# Patient Record
Sex: FEMALE | Race: WHITE | NOT HISPANIC OR LATINO | Employment: FULL TIME | URBAN - NONMETROPOLITAN AREA
[De-identification: names, ages, dates, MRNs, and addresses within clinical notes are randomized per-mention and may not be internally consistent; named-entity substitution may affect disease eponyms.]

---

## 2020-12-07 ENCOUNTER — OCCUPATIONAL MEDICINE (OUTPATIENT)
Dept: URGENT CARE | Facility: PHYSICIAN GROUP | Age: 50
End: 2020-12-07
Payer: COMMERCIAL

## 2020-12-07 VITALS
HEIGHT: 70 IN | RESPIRATION RATE: 12 BRPM | HEART RATE: 71 BPM | SYSTOLIC BLOOD PRESSURE: 142 MMHG | BODY MASS INDEX: 20.04 KG/M2 | TEMPERATURE: 98.6 F | OXYGEN SATURATION: 99 % | WEIGHT: 140 LBS | DIASTOLIC BLOOD PRESSURE: 90 MMHG

## 2020-12-07 DIAGNOSIS — S20.221A CONTUSION OF RIGHT SIDE OF BACK, INITIAL ENCOUNTER: ICD-10-CM

## 2020-12-07 PROCEDURE — 99204 OFFICE O/P NEW MOD 45 MIN: CPT | Performed by: PHYSICIAN ASSISTANT

## 2020-12-07 ASSESSMENT — PAIN SCALES - GENERAL: PAINLEVEL: 8=MODERATE-SEVERE PAIN

## 2020-12-07 NOTE — PROGRESS NOTES
"Chief Complaint   Patient presents with   • Work-Related Injury     Back px, Buckets fell on top of her. x1day        HISTORY OF PRESENT ILLNESS: Patient is a 49 y.o. 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 Bins were heavy, unsure of exact weight.  Feels a little better today but still has pain. Current pain: 8/10.  Triggers: lifting, prolonged standing/walking.  Relievers: \"marijuana gummies\". OTC meds tried: ibuprofen, helps take the edge off.  History of back issues: none.  Other places of employment: none.    Allergies:Patient has no known allergies.    PMH: No pertinent past medical history to this problem  MEDS: Medications were reviewed in Epic  ALLERGIES: Allergies were reviewed in Epic  SOCHX: Works as a Dispenser OGP at [employer]  FH: No pertinent family history to this problem    Review of Systems:    Constitutional ROS: No unexpected change in weight, No weakness, No fatigue  Musculoskeletal/Extremities ROS: Positive for back pain.  Skin/Integumentary ROS: No edema, No evidence of rash, No itching    Exam:  /90   Pulse 71   Temp 37 °C (98.6 °F) (Temporal)   Resp 12   Ht 1.778 m (5' 10\")   Wt 63.5 kg (140 lb)   SpO2 99%   General: Well developed, well nourished. No distress.  Pulmonary: Unlabored respiratory effort.    Musculoskeletal: Diffuse tenderness in the paraspinous muscles of the right side of the back, worse in the lumbar region.  No vertebral tenderness noted.  No ecchymosis, abrasions, erythema, or skin changes noted in the area of pain.  Stiff movement.  No motor or sensory deficit noted of the lower extremities.  Skin: Warm, dry, good turgor. No rashes in visible areas.   Psych: Normal mood. Alert and oriented x3. Judgment and insight is normal.    Assessment/Plan:  Low suspicion for fracture.  Discussed appropriate dosing of " ibuprofen/acetaminophen as needed for pain.  May use ice, heat, and over-the-counter muscle rubs as needed for additional pain relief.  Refer to D 39 for restrictions.  Return to clinic in 1 week for reevaluation, sooner for any significant changes in symptoms.  1. Contusion of right side of back, initial encounter

## 2020-12-07 NOTE — LETTER
"EMPLOYEE’S CLAIM FOR COMPENSATION/ REPORT OF INITIAL TREATMENT  FORM C-4    EMPLOYEE’S CLAIM - PROVIDE ALL INFORMATION REQUESTED   First Name  Giuliana Last Name  Alexia Birthdate                    1970                Sex  female Claim Number   Home Address  [address] Age  49 y.o. Height  1.778 m (5' 10\") Weight  63.5 kg (140 lb) [facility]     [facility] [place] Zip  [address] Telephone  [phone] (home)    Mailing Address  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 (if applicable)  I was dispensing an order     How did this injury or occupational disease occur? (Be specific an answer in detail. Use additional sheet if necessary)  I took an order out, and on the ramp, the bins on the order fell on my back    If you believe that you have an occupational disease, when did you first have knowledge of the disability and it relationship to your employment?  NA Witnesses to the Accident  Luis Fernando Carrero      Nature of Injury or Occupational Disease  Strain  Part(s) of Body Injured or Affected  Lower Back Area (Lumbar Area & Lumbo-Sacral), N/A, N/A    I certify that the above is true and correct to the best of my knowledge and that I have provided this information in " order to obtain the benefits of [place]  I hereby authorize any physician, chiropractor, surgeon, practitioner, or other person, any hospital, including [facility] or [facility] hospital, any medical service organization, any insurance company, or other institution or organization to release to each other, any medical or other information, including benefits paid or payable, pertinent to this injury or disease, except information relative to diagnosis, treatment and/or counseling for AIDS, psychological conditions, alcohol or controlled substances, for which I must give specific authorization.  A Photostat of this authorization shall be as valid as the original.     Date 12/07/2020   Place [facility]   Employee’s Signature   THIS REPORT MUST BE COMPLETED AND MAILED WITHIN 3 WORKING DAYS OF TREATMENT   Place  [facility]  Name of Facility  [place]   Date  12/7/2020 Diagnosis  (S20.221A) Contusion of right side of back, initial encounter Is there evidence the injured employee was under the              influence of alcohol and/or another controlled substance at the time of accident?   Hour  9:13 AM Description of Injury or Disease  The encounter diagnosis was Contusion of right side of back, initial encounter. No   Treatment  Assessment/Plan:  Low suspicion for fracture.  Discussed appropriate dosing of ibuprofen/acetaminophen as needed for pain.  May use ice, heat, and over-the-counter muscle rubs as needed for additional pain relief.  Refer to D 39 for restrictions.  Return to clinic in 1 week for reevaluation, sooner for any significant changes in symptoms.  1. Contusion of right side of back, initial encounter        Have you advised the patient to remain off work five days or     more? No   X-Ray Findings      If Yes   From Date  To Date      From information given by the  "employee, together with medical evidence, can you directly connect this injury or occupational disease as job incurred?  Yes If No Full Duty    No Modified Duty  Yes   Is additional medical care by a physician indicated?  Yes If Modified Duty, Specify any Limitations / Restrictions  See D39   Do you know of any previous injury or disease contributing to this condition or occupational disease?                            No   Date  12/7/2020 Print Doctor’s Name   Kimmie Mejia P.A.-C. I certify the employer’s copy of  this form was mailed on:   Address  [address] Insurer’s Use Only     [place]  [address]    Provider’s Tax ID Number  [id number] Telephone  Dept: [phone]      e-KIMMIE Mc P.A.-C.  Signature:     Degree          ORIGINAL-TREATING PHYSICIAN OR CHIROPRACTOR    PAGE 2-INSURER/TPA    PAGE 3-EMPLOYER    PAGE 4-EMPLOYEE        Form C-4 (rev.10/07)          BRIEF DESCRIPTION OF RIGHTS AND BENEFITS  (Pursuant to [place] 616C.050)    Notice of Injury or Occupational Disease (Incident Report Form C-1): If an injury or occupational disease (OD) arises out of and in the course of employment, you must provide written notice to your employer as soon as practicable, but no later than 7 days after the accident or OD. Your employer shall maintain a sufficient supply of the required forms.     Claim for Compensation (Form C-4): If medical treatment is sought, the form C-4 is available at the place of initial treatment. A completed \"Claim for Compensation\" (Form C-4) must be filed within 90 days after an accident or OD. The treating physician or chiropractor must, within 3 working days after treatment, complete and mail to the employer, the employer's insurer and third-party , the Claim for Compensation.     Medical Treatment: If you require medical treatment for your on-the-job injury or OD, you may be required to select a physician or chiropractor from a list " provided by your workers’ compensation insurer, if it has contracted with an Organization for Managed Care (MCO) or Preferred Provider Organization (PPO) or providers of health care. If your employer has not entered into a contract with an MCO or PPO, you may select a physician or chiropractor from the Panel of Physicians and Chiropractors. Any medical costs related to your industrial injury or OD will be paid by your insurer.     Temporary Total Disability (TTD): If your doctor has certified that you are unable to work for a period of at least 5 consecutive days, or 5 cumulative days in a 20-day period, or places restrictions on you that your employer does not accommodate, you may be entitled to TTD compensation.     Temporary Partial Disability (TPD): If the wage you receive upon reemployment is less than the compensation for TTD to which you are entitled, the insurer may be required to pay you TPD compensation to make up the difference. TPD can only be paid for a maximum of 24 months.     Permanent Partial Disability (PPD): When your medical condition is stable and there is an indication of a PPD as a result of your injury or OD, within 30 days, your insurer must arrange for an evaluation by a rating physician or chiropractor to determine the degree of your PPD. The amount of your PPD award depends on the date of injury, the results of the PPD evaluation and your age and wage.     Permanent Total Disability (PTD): If you are medically certified by a treating physician or chiropractor as permanently and totally disabled and have been granted a PTD status by your insurer, you are entitled to receive monthly benefits not to exceed 66 2/3% of your average monthly wage. The amount of your PTD payments is subject to reduction if you previously received a PPD award.     Vocational Rehabilitation Services: You may be eligible for vocational rehabilitation services if you are unable to return to the job due to a permanent  physical impairment or permanent restrictions as a result of your injury or occupational disease.     Transportation and Per Isidra Reimbursement: You may be eligible for travel expenses and per isidra associated with medical treatment.     Reopening: You may be able to reopen your claim if your condition worsens after claim closure.     Appeal Process: If you disagree with a written determination issued by the insurer or the insurer does not respond to your request, you may appeal to the Department of Administration, , by following the instructions contained in your determination letter. You must appeal the determination within 70 days from the date of the determination letter at [address], [address], [place], [place] [address], or [address], [address], [place] [address]. If you disagree with the  decision, you may appeal to the Department of Administration, . You must file your appeal within 30 days from the date of the  decision letter at [address], [address], [place], [place] [address], or [address] [facility] [address], [place] [address]. If you disagree with a decision of an , you may file a petition for judicial review with the District Court. You must do so within 30 days of the Appeal Officer’s decision. You may be represented by an  at your own expense or you may contact the [facility] for possible representation.     [place]  for Injured Workers (NAIW): If you disagree with a  decision, you may request that NAIW represent you without charge at an  Hearing. For information regarding denial of benefits, you may contact the [facility] at: [address], [address] [address], [phone], or [address], [address], [place] [address], [phone]     To File a Complaint with the Division: If you wish to file a complaint  with the  of the Division of Industrial Relations (DIR), please contact the Workers’ Compensation Section, [address], [address], [place], [place] [address], telephone [phone], or [address], [address], [place], [place] [address], telephone [phone].     For assistance with Workers’ Compensation Issues: You may contact the Office of the Governor Consumer Health Assistance, [address] [place] [address], [address], [place], [place] [address], Toll Free 1-454.192.1389, Web site: [email or web address]., E-mail [email or web address].              __________________________________________________________________                              __________12/07/2020_________         Employee Name / Signature                                                                                                                     Date                                                                                                                                                                                       D-2 (rev. 01/20)

## 2020-12-07 NOTE — LETTER
"   [facility]  [address] [place] [address] [place] [address]  Phone:  [phone] - Fax:  [phone]   [facility] Progress Report and Disability Certification  Date of Service: 12/7/2020   No Show:  No  Date / Time of Next Visit: 12/14/2020   Claim Information   Patient Name: Giuliana Hale  Claim Number:     Employer: [employer]  Date of Injury: 12/6/2020     Insurer / TPA: [employer]  ID / SSN:     Occupation: Dispenser OGP  Diagnosis: The encounter diagnosis was Contusion of right side of back, initial encounter.    Medical Information   Related to Industrial Injury? Yes    Subjective Complaints:  DOI: 12/6/2020 around 0800 hrs., initial evaluation  HERMANN: Patient was dispensing an order number on the ramp, bins fell on the right side of her back. Bins were heavy, unsure of exact weight.  Feels a little better today but still has pain. Current pain: 8/10.  Triggers: lifting, prolonged standing/walking.  Relievers: \"marijuana gummies\". OTC meds tried: ibuprofen, helps take the edge off.  History of back issues: none.  Other places of employment: none.     Objective Findings: Musculoskeletal: Diffuse tenderness in the paraspinous muscles of the right side of the back, worse in the lumbar region.  No vertebral tenderness noted.  No ecchymosis, abrasions, erythema, or skin changes noted in the area of pain.  Stiff movement.  No motor or sensory deficit noted of the lower extremities.   Pre-Existing Condition(s):     Assessment:   Initial Visit    Status: Additional Care Required  Permanent Disability:No    Plan:      Diagnostics:      Comments:  Low suspicion for fracture.  Discussed appropriate dosing of ibuprofen/acetaminophen as needed for pain.  May use ice, heat, and over-the-counter muscle rubs as needed for additional pain relief.  Refer to D 39 for restrictions.  Return to clinic in   1 week for reevaluation, sooner for any significant " changes in symptoms.  1. Contusion of right side of back, initial encounter        Disability Information   Status: Released to Restricted Duty    From:  2020  Through: 2020 Restrictions are: Temporary   Physical Restrictions   Sitting:    Standing:    Stoopin hrs/day Bendin hrs/day   Squattin hrs/day Walking:    Climbing:    Pushing:      Pulling:    Other:    Reaching Above Shoulder (L):   Reaching Above Shoulder (R):       Reaching Below Shoulder (L):    Reaching Below Shoulder (R):      Not to exceed Weight Limits   Carrying(hrs):   Weight Limit(lb): < or = to 10 pounds Lifting(hrs):   Weight  Limit(lb): < or = to 10 pounds   Comments: Must be allowed to sit for up to 20 minutes every hour as needed for pain.    Repetitive Actions   Hands: i.e. Fine Manipulations from Grasping:     Feet: i.e. Operating Foot Controls:     Driving / Operate Machinery:     Provider Name:   Olimpia Mejia P.A.-C. Physician Signature:  Physician Name:     Clinic Name / Location: [address] [address] Clinic Phone Number: Dept: [phone]   Appointment Time: 9:00 Am Visit Start Time: 9:13 AM   Check-In Time:  8:21 Am Visit Discharge Time:  9:36AM   Original-Treating Physician or Chiropractor    Page 2-Insurer/TPA    Page 3-Employer    Page 4-Employee

## 2020-12-14 ENCOUNTER — OCCUPATIONAL MEDICINE (OUTPATIENT)
Dept: URGENT CARE | Facility: PHYSICIAN GROUP | Age: 50
End: 2020-12-14
Payer: COMMERCIAL

## 2020-12-14 VITALS
TEMPERATURE: 97.9 F | BODY MASS INDEX: 20.47 KG/M2 | WEIGHT: 143 LBS | SYSTOLIC BLOOD PRESSURE: 114 MMHG | HEART RATE: 80 BPM | OXYGEN SATURATION: 100 % | DIASTOLIC BLOOD PRESSURE: 80 MMHG | HEIGHT: 70 IN | RESPIRATION RATE: 16 BRPM

## 2020-12-14 DIAGNOSIS — S20.221D CONTUSION OF RIGHT SIDE OF BACK, SUBSEQUENT ENCOUNTER: ICD-10-CM

## 2020-12-14 PROCEDURE — 99214 OFFICE O/P EST MOD 30 MIN: CPT | Performed by: PHYSICIAN ASSISTANT

## 2020-12-14 RX ORDER — PREDNISONE 10 MG/1
TABLET ORAL
Qty: 1 QUANTITY SUFFICIENT | Refills: 0 | Status: SHIPPED | OUTPATIENT
Start: 2020-12-14 | End: 2022-11-16

## 2020-12-14 RX ORDER — CYCLOBENZAPRINE HCL 10 MG
10 TABLET ORAL 3 TIMES DAILY PRN
Qty: 30 TAB | Refills: 0 | Status: SHIPPED | OUTPATIENT
Start: 2020-12-14 | End: 2022-11-16

## 2020-12-14 NOTE — LETTER
"   [facility]  [address] [place] [address] [place] [address]  Phone:  [phone] - Fax:  [phone]   [facility] Progress Report and Disability Certification  Date of Service: 2020   No Show:  No  Date / Time of Next Visit: 2020   Claim Information   Patient Name: Giuliana Hale  Claim Number:     Employer: [employer] *** Date of Injury: 2020     Insurer / TPA: [employer] *** ID / SSN:     Occupation: Dispenser OGP *** Diagnosis: The encounter diagnosis was Contusion of right side of back, subsequent encounter.    Medical Information   Related to Industrial Injury? Yes ***   Subjective Complaints:  DOI: 2020; 2nd evaluation  HERMANN: Patient was dispensing an order number on the ramp, bins fell on the right side of her back. Bins were heavy, unsure of exact weight.    No change in pain from initial evaluation.  Triggers: lifting, prolonged standing/walking.  Relievers: \"marijuana gummies\". OTC meds tried: ibuprofen, helps take the edge off.  History of back issues: none.  Other places of employment: none.   Objective Findings: Musculoskeletal: Diffuse tenderness in the paraspinous muscles of the entire lumbar region. No vertebral tenderness noted.  No ecchymosis, abrasions, erythema, or skin changes noted in the area of pain.  Stiff movement.  No motor or sensory deficit noted of the lower extremities.   Pre-Existing Condition(s):     Assessment:   Condition Same    Status: Additional Care Required  Permanent Disability:No    Plan: Medication    Diagnostics:      Comments:       Disability Information   Status: Released to Restricted Duty    From:  2020  Through: 2020 Restrictions are: Temporary   Physical Restrictions   Sitting:    Standing:    Stoopin hrs/day Bendin hrs/day   Squattin hrs/day Walking:    Climbing:    Pushing:      Pulling:    Other:    Reaching Above Shoulder (L):   Reaching " Above Shoulder (R):       Reaching Below Shoulder (L):    Reaching Below Shoulder (R):      Not to exceed Weight Limits   Carrying(hrs):   Weight Limit(lb): < or = to 10 pounds Lifting(hrs):   Weight  Limit(lb): < or = to 10 pounds   Comments: Must be allowed to sit for up to 20 minutes every hour as needed for pain.     Repetitive Actions   Hands: i.e. Fine Manipulations from Grasping:     Feet: i.e. Operating Foot Controls:     Driving / Operate Machinery:     Provider Name:   Olimpia Mejia P.A.-C. Physician Signature:  Physician Name:     Clinic Name / Location: [address] [address] Clinic Phone Number: Dept: [phone]   Appointment Time: 9:00 Am Visit Start Time: 9:11 AM   Check-In Time:  8:59 Am Visit Discharge Time:  ***   Original-Treating Physician or Chiropractor    Page 2-Insurer/TPA    Page 3-Employer    Page 4-Employee

## 2020-12-14 NOTE — PROGRESS NOTES
"Chief Complaint   Patient presents with   • Work-Related Injury     Follow-Up visit, Back px, no improvement        HISTORY OF PRESENT ILLNESS: Patient is a 49 y.o. 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 Bins were heavy, unsure of exact weight.    No change in pain from initial evaluation.  Triggers: lifting, prolonged standing/walking.  Relievers: \"marijuana gummies\". OTC meds tried: ibuprofen, helps take the edge off.  History of back issues: none.  Other places of employment: none.    Allergies:Patient has no known allergies.    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  Skin/Integumentary ROS: No edema, No evidence of rash, No itching    Exam:  /80   Pulse 80   Temp 36.6 °C (97.9 °F) (Temporal)   Resp 16   Ht 1.778 m (5' 10\")   Wt 64.9 kg (143 lb)   SpO2 100%   General: Well developed, well nourished. No distress.  Pulmonary: Unlabored respiratory effort.    Musculoskeletal: Diffuse tenderness in the paraspinous muscles of the entire lumbar region. No vertebral tenderness noted.  No ecchymosis, abrasions, erythema, or skin changes noted in the area of pain.  Stiff movement.  No motor or sensory deficit noted of the lower extremities.  Skin: Warm, dry, good turgor. No rashes in visible areas.   Psych: Normal mood. Alert and oriented x3. Judgment and insight is normal.    Assessment/Plan:  No improvement from previous visit.  No change in physical exam.  We will have patient try steroids and a muscle relaxer.  Discussed " appropriate dosing of ibuprofen/acetaminophen as needed for pain.  May use ice, heat, and over-the-counter muscle rubs as needed for additional pain relief.  Refer to D 39 for restrictions.  Return to clinic in approximately 1 week for reevaluation, sooner for any significant changes in symptoms.  1. Contusion of right side of back, subsequent encounter  predniSONE (DELTASONE) 10 MG Tab    cyclobenzaprine (FLEXERIL) 10 mg Tab

## 2020-12-14 NOTE — LETTER
"   [facility]  [address] [place] [address] [place] [address]  Phone:  [phone] - Fax:  [phone]   [facility] Progress Report and Disability Certification  Date of Service: 12/14/2020   No Show:  No  Date / Time of Next Visit: 12/21/2020   Claim Information   Patient Name: Giuliana Hale  Claim Number:     Employer: [employer]  Date of Injury: 12/6/2020     Insurer / TPA: [employer]  ID / SSN:     Occupation: Dispenser OGP  Diagnosis: The encounter diagnosis was Contusion of right side of back, subsequent encounter.    Medical Information   Related to Industrial Injury? Yes    Subjective Complaints:  DOI: 12/6/2020; 2nd evaluation  HERMANN: Patient was dispensing an order number on the ramp, bins fell on the right side of her back. Bins were heavy, unsure of exact weight.    No change in pain from initial evaluation.  Triggers: lifting, prolonged standing/walking.  Relievers: \"marijuana gummies\". OTC meds tried: ibuprofen, helps take the edge off.  History of back issues: none.  Other places of employment: none.   Objective Findings: Musculoskeletal: Diffuse tenderness in the paraspinous muscles of the entire lumbar region. No vertebral tenderness noted.  No ecchymosis, abrasions, erythema, or skin changes noted in the area of pain.  Stiff movement.  No motor or sensory deficit noted of the lower extremities.   Pre-Existing Condition(s):     Assessment:   Condition Same    Status: Additional Care Required  Permanent Disability:No    Plan: Medication    Diagnostics:      Comments:  No improvement from previous visit.  No change in physical exam.  We will have patient try steroids and a muscle relaxer.  Discussed appropriate dosing of ibuprofen/acetaminophen as needed for pain.  May use ice, heat, and over-the-counter muscle rub  s as needed for additional pain relief.  Refer to D 39 for restrictions.  Return to clinic in approximately 1 week " for reevaluation, sooner for any significant changes in symptoms.  1. Contusion of right side of back, subsequent encounter  predniSONE   (DELTASONE) 10 MG Tab   cyclobenzaprine (FLEXERIL) 10 mg Tab        Disability Information   Status: Released to Restricted Duty    From:  2020  Through: 2020 Restrictions are: Temporary   Physical Restrictions   Sitting:    Standing:    Stoopin hrs/day Bendin hrs/day   Squattin hrs/day Walking:    Climbing:    Pushing:      Pulling:    Other:    Reaching Above Shoulder (L):   Reaching Above Shoulder (R):       Reaching Below Shoulder (L):    Reaching Below Shoulder (R):      Not to exceed Weight Limits   Carrying(hrs):   Weight Limit(lb): < or = to 10 pounds Lifting(hrs):   Weight  Limit(lb): < or = to 10 pounds   Comments: Must be allowed to sit for up to 20 minutes every hour as needed for pain.     Repetitive Actions   Hands: i.e. Fine Manipulations from Grasping:     Feet: i.e. Operating Foot Controls:     Driving / Operate Machinery:     Provider Name:   Olimpia Mejia P.A.-C. Physician Signature:  Physician Name:     Clinic Name / Location: [address] [address] Clinic Phone Number: Dept: [phone]   Appointment Time: 9:00 Am Visit Start Time: 9:11 AM   Check-In Time:  8:59 Am Visit Discharge Time:  9:39 am    Original-Treating Physician or Chiropractor    Page 2-Insurer/TPA    Page 3-Employer    Page 4-Employee

## 2020-12-21 ENCOUNTER — OCCUPATIONAL MEDICINE (OUTPATIENT)
Dept: URGENT CARE | Facility: PHYSICIAN GROUP | Age: 50
End: 2020-12-21
Payer: COMMERCIAL

## 2020-12-21 VITALS
OXYGEN SATURATION: 96 % | HEART RATE: 67 BPM | SYSTOLIC BLOOD PRESSURE: 118 MMHG | HEIGHT: 71 IN | TEMPERATURE: 98.7 F | WEIGHT: 140 LBS | DIASTOLIC BLOOD PRESSURE: 76 MMHG | RESPIRATION RATE: 12 BRPM | BODY MASS INDEX: 19.6 KG/M2

## 2020-12-21 DIAGNOSIS — S20.221D BACK CONTUSION, RIGHT, SUBSEQUENT ENCOUNTER: ICD-10-CM

## 2020-12-21 PROCEDURE — 99213 OFFICE O/P EST LOW 20 MIN: CPT | Performed by: NURSE PRACTITIONER

## 2020-12-21 ASSESSMENT — ENCOUNTER SYMPTOMS: BACK PAIN: 1

## 2020-12-21 NOTE — LETTER
[facility]  [address] [place] [address] [place] [address]  Phone:  [phone] - Fax:  [phone]   [facility] Progress Report and Disability Certification  Date of Service: 12/21/2020   No Show:  No  Date / Time of Next Visit: 12/28/2020   Claim Information   Patient Name: Giuliana Hale  Claim Number:     Employer: [employer]  Date of Injury: 12/6/2020     Insurer / TPA: [employer]  ID / SSN:     Occupation: Dispenser OGP  Diagnosis: The encounter diagnosis was Back contusion, right, subsequent encounter.    Medical Information   Related to Industrial Injury? Yes    Subjective Complaints:  DOI: 12/6/20  Third visit  Today is patient's third visit for contusion to the lower back area.  Initial injury was sustained on December 6.  Patient's first visit in urgent care was on December 8, with a follow-up visit on December 14.  At patient's follow-up visit on the 14th, her pain was still noted to be the same with no improvement.  Patient was given a prescription for prednisone and Flexeril; however, patient states that she did not understand that she had a prescription and did not get these medications filled.  Previous triggers noted to be standing for prolonged periods and walking.  Previous relievers are noted to be marijuana Gummies.  Patient states that she still has pain to the right side of the back but has had some improvement since the last visit.   Objective Findings: Point tenderness to the right side of the lower back from the thoracic area down to the lumbar area.  Mild point tenderness over the lumbar spine.  Straight leg raise negative bilaterally.  Patient experiences pain with lateral rotation at 30 degrees right, negative on lateral rotation left.  Patient experiences pain with lateral bending right at 20 degrees, negative lateral bending left.     Pre-Existing Condition(s):     Assessment:   Condition Improved    Status:  Additional Care Required  Permanent Disability:No    Plan: MedicationMedication (NOT at Work)  Comments:fill RX for prednisone and flexeril today; should not take flexeril at work.     Diagnostics:      Comments:       Disability Information   Status: Released to Restricted Duty    From:  2020  Through: 2020 Restrictions are: Temporary   Physical Restrictions   Sitting:    Standing:    Stoopin hrs/day Bendin hrs/day   Squattin hrs/day Walking:    Climbing:    Pushing:      Pulling:    Other:    Reaching Above Shoulder (L):   Reaching Above Shoulder (R):       Reaching Below Shoulder (L):    Reaching Below Shoulder (R):      Not to exceed Weight Limits   Carrying(hrs):   Weight Limit(lb): < or = to 10 pounds Lifting(hrs):   Weight  Limit(lb): < or = to 10 pounds   Comments: Allow for sitting up to 20 minutes every hour as needed. Return in 1 week for follow up.  States she did not understand she had been given a prescription at the last visit.  I have instructed her to fill those medications.  Continue present restrictions and return in 1 week for follow-up.  May continue ibuprofen as needed, over-the-counter muscle rub as needed.      Repetitive Actions   Hands: i.e. Fine Manipulations from Grasping:     Feet: i.e. Operating Foot Controls:     Driving / Operate Machinery:     Provider Name:   Cathey J Hamman, A.P.N. Physician Signature:  Physician Name:     Clinic Name / Location: [address] [address] Clinic Phone Number: Dept: [phone]   Appointment Time: 9:20 Am Visit Start Time: 9:15 AM   Check-In Time:  9:11 Am Visit Discharge Time:  9:38AM    Original-Treating Physician or Chiropractor    Page 2-Insurer/TPA    Page 3-Employer    Page 4-Employee

## 2020-12-21 NOTE — LETTER
[facility]  [address] [place] [address] [place] [address]  Phone:  [phone] - Fax:  [phone]   [facility] Progress Report and Disability Certification  Date of Service: 12/21/2020   No Show:  No  Date / Time of Next Visit: 12/28/2020   Claim Information   Patient Name: Giuliana Hale  Claim Number:     Employer: [employer] *** Date of Injury: 12/6/2020     Insurer / TPA: [employer] *** ID / SSN:     Occupation: Dispenser OGP *** Diagnosis: The encounter diagnosis was Back contusion, right, subsequent encounter.    Medical Information   Related to Industrial Injury? Yes ***   Subjective Complaints:  DOI: 12/6/20  Third visit  Today is patient's third visit for contusion to the lower back area.  Initial injury was sustained on December 6.  Patient's first visit in urgent care was on December 8, with a follow-up visit on December 14.  At patient's follow-up visit on the 14th, her pain was still noted to be the same with no improvement.  Patient was given a prescription for prednisone and Flexeril; however, patient states that she did not understand that she had a prescription and did not get these medications filled.  Previous triggers noted to be standing for prolonged periods and walking.  Previous relievers are noted to be marijuana Gummies.  Patient states that she still has pain to the right side of the back but has had some improvement since the last visit.   Objective Findings:     Pre-Existing Condition(s):     Assessment:        Status: Additional Care Required  Permanent Disability:No    Plan: MedicationMedication (NOT at Work)  Comments:fill RX for prednisone and flexeril today; should not take flexeril at work.     Diagnostics:      Comments:       Disability Information   Status: Released to Restricted Duty    From:  12/21/2020  Through: 12/28/2020 Restrictions are: Temporary   Physical Restrictions   Sitting:     Standing:    Stoopin hrs/day Bendin hrs/day   Squattin hrs/day Walking:    Climbing:    Pushing:      Pulling:    Other:    Reaching Above Shoulder (L):   Reaching Above Shoulder (R):       Reaching Below Shoulder (L):    Reaching Below Shoulder (R):      Not to exceed Weight Limits   Carrying(hrs):   Weight Limit(lb): < or = to 10 pounds Lifting(hrs):   Weight  Limit(lb): < or = to 10 pounds   Comments: Allow for sitting up to 20 minutes every hour as needed. Return in 1 week for follow up.  States she did not understand she had been given a prescription at the last visit.  I have instructed her to fill those medications.  Continue present restrictions and return in 1 week for follow-up.  May continue ibuprofen as needed, over-the-counter muscle rub as needed.      Repetitive Actions   Hands: i.e. Fine Manipulations from Grasping:     Feet: i.e. Operating Foot Controls:     Driving / Operate Machinery:     Provider Name:   Cathey J Hamman, A.P.N. Physician Signature:  Physician Name:     Clinic Name / Location: [address] [address] Clinic Phone Number: Dept: [phone]   Appointment Time: 9:20 Am Visit Start Time: 9:15 AM   Check-In Time:  9:11 Am Visit Discharge Time:  ***   Original-Treating Physician or Chiropractor    Page 2-Insurer/TPA    Page 3-Employer    Page 4-Employee

## 2020-12-21 NOTE — PROGRESS NOTES
"Subjective:      Giuliana Hale is a 49 y.o. female who presents with Back Injury ( fv)      DOI: 12/6/20  Third visit  Today is patient's third visit for contusion to the lower back area.  Initial injury was sustained on December 6.  Patient's first visit in urgent care was on December 8, with a follow-up visit on December 14.  At patient's follow-up visit on the 14th, her pain was still noted to be the same with no improvement.  Patient was given a prescription for prednisone and Flexeril; however, patient states that she did not understand that she had a prescription and did not get these medications filled.  Previous triggers noted to be standing for prolonged periods and walking.  Previous relievers are noted to be marijuana Gummies.  Patient states that she still has pain to the right side of the back but has had some improvement since the last visit.     Other  This is a new problem. Episode onset: 12/6/20. The problem occurs constantly. The problem has been gradually improving. Associated symptoms comments: Back pain. Exacerbated by: walking, standing, lifting. She has tried NSAIDs, ice and rest for the symptoms. The treatment provided mild relief.       Review of Systems   Musculoskeletal: Positive for back pain.   All other systems reviewed and are negative.         Objective:     /76 (BP Location: Left arm, Patient Position: Sitting, BP Cuff Size: Adult)   Pulse 67   Temp 37.1 °C (98.7 °F) (Temporal)   Resp 12   Ht 1.803 m (5' 11\")   Wt 63.5 kg (140 lb)   SpO2 96%   BMI 19.53 kg/m²      Physical Exam  Vitals signs reviewed.   Musculoskeletal:        Back:          Point tenderness to the right side of the lower back from the thoracic area down to the lumbar area.  Mild point tenderness over the lumbar spine.  Straight leg raise negative bilaterally.  Patient experiences pain with lateral rotation at 30 degrees right, negative on lateral rotation left.  Patient experiences pain " with lateral bending right at 20 degrees, negative lateral bending left.         Assessment/Plan:        1. Back contusion, right, subsequent encounter    Patient was advised that her prescriptions were sent electronically to the pharmacy at [facility].  I have advised her to fill those and take the prescriptions  May continue to use ice and heat and topical muscle analgesics as needed  Continue present restrictions; see D 39 for details  Return in 1 week for follow-up

## 2020-12-28 ENCOUNTER — OCCUPATIONAL MEDICINE (OUTPATIENT)
Dept: URGENT CARE | Facility: PHYSICIAN GROUP | Age: 50
End: 2020-12-28
Payer: COMMERCIAL

## 2020-12-28 VITALS
BODY MASS INDEX: 20.56 KG/M2 | SYSTOLIC BLOOD PRESSURE: 128 MMHG | TEMPERATURE: 98.8 F | RESPIRATION RATE: 16 BRPM | DIASTOLIC BLOOD PRESSURE: 66 MMHG | HEART RATE: 66 BPM | HEIGHT: 70 IN | OXYGEN SATURATION: 94 % | WEIGHT: 143.6 LBS

## 2020-12-28 DIAGNOSIS — S20.221D BACK CONTUSION, RIGHT, SUBSEQUENT ENCOUNTER: ICD-10-CM

## 2020-12-28 PROCEDURE — 99214 OFFICE O/P EST MOD 30 MIN: CPT | Performed by: STUDENT IN AN ORGANIZED HEALTH CARE EDUCATION/TRAINING PROGRAM

## 2020-12-28 NOTE — PROGRESS NOTES
"Subjective:     Giuliana Hale is a 50 y.o. female who presents for Work-Related Injury (FV, pt states that there still is pain at times but feels a bit better )      DOI: 12/6/20.   Today is patient's 4th visit for contusion to the lower back area.  Initial injury was sustained on December 6.  Patient's first visit in urgent care was on December 8, with a follow-up visit on December 14.  At patient's follow-up visit on the 14th, her pain was still noted to be the same with no improvement.  Patient was given a prescription for prednisone and Flexeril; however, patient states that she did not understand that she had a prescription and did not get these medications filled.  Previous triggers noted to be standing for prolonged periods and walking.  Previous relievers are noted to be marijuana Gummies.       Today the patient states she is feeling better. States she is about 75% better and gradually improving. She still has not filled her Rxs for prednisone and flexeril. Says she is tolerating light work. She continues to experience dull right LBP; no longer experiencing sharp pain. Sx are b/w ibuprofen. Sx are w/w extended sitting. She wakes in the morning without any pain. No radiculopathy.       PMH:   No pertinent past medical history to this problem  MEDS:  Medications were reviewed in EMR  ALLERGIES:  Allergies were reviewed in EMR  SOCHX:  Works at [employer]   FH:   No pertinent family history to this problem       Objective:     /66   Pulse 66   Temp 37.1 °C (98.8 °F) (Temporal)   Resp 16   Ht 1.778 m (5' 10\")   Wt 65.1 kg (143 lb 9.6 oz)   SpO2 94%   BMI 20.60 kg/m²     Gen: no acute distress, normal voice  Skin: dry, intact, moist mucosal membranes  Lungs: CTAB w/ symmetric expansion  CV: RRR w/o murmurs or clicks  Msk: no erythema, ecchymosis or edema. Full ROM w/o any discernible discomfort. Very mild right paraspinal mm TTP. No midline TTP. Distal sensation and motor fully intact. "   Psych: normal affect, normal judgement, alert, awake      Assessment/Plan:       1. Back contusion, right, subsequent encounter    • Released to Restricted Duty FROM 12/28/2020 TO 1/4/2021  Feels about 75% better and continues to improve. She did not fill the Rxs for prednisone and flexeril; at this point those medications would likely provide limited benefit.   -provided updated work restrictions   -continue ibuprofen as needed  -activity as tolerated  -f/u in one week         Differential diagnosis, natural history, supportive care, and indications for immediate follow-up discussed.

## 2020-12-28 NOTE — LETTER
[facility]  [address] [place] [address] [place] [address]  Phone:  [phone] - Fax:  [phone]   [facility] Progress Report and Disability Certification  Date of Service: 12/28/2020   No Show:  No  Date / Time of Next Visit: 1/4/2021   Claim Information   Patient Name: Giuliana Hale  Claim Number:     Employer: [employer]  Date of Injury: 12/6/2020     Insurer / TPA: [employer]  ID / SSN:     Occupation: Dispenser OGP  Diagnosis: There were no encounter diagnoses.    Medical Information   Related to Industrial Injury? Yes    Subjective Complaints:  DOI: 12/6/20.   Today is patient's 4th visit for contusion to the lower back area.  Initial injury was sustained on December 6.  Patient's first visit in urgent care was on December 8, with a follow-up visit on December 14.  At patient's follow-up visit on the 14th, her pain was still noted to be the same with no improvement.  Patient was given a prescription for prednisone and Flexeril; however, patient states that she did not understand that she had a prescription and did not get these medications filled.  Previous triggers noted to be standing for prolonged periods and walking.  Previous relievers are noted to be marijuana Gummies.       Today the patient states she is feeling better. States she is about 75% better and gradually improving. She still has not filled her Rxs for prednisone and flexeril. Says she is tolerating light work. She continues to experience dull right LBP; no longer experiencing sharp pain. Sx are b/w ibuprofen. Sx are w/w extended sitting. She wakes in the morning without any pain. No radiculopathy.      Objective Findings: Gen: no acute distress, normal voice  Skin: dry, intact, moist mucosal membranes  Lungs: CTAB w/ symmetric expansion  CV: RRR w/o murmurs or clicks  Msk: no erythema, ecchymosis or edema. Full ROM w/o any discernible discomfort. Very mild right  paraspinal mm TTP. No midline TTP. Distal sensation and motor fully intact.   Psych: normal affect, normal judgement, alert, awake     Pre-Existing Condition(s):     Assessment:   Condition Improved    Status: Additional Care Required  Permanent Disability:No    Plan:      Diagnostics:      Comments:       Disability Information   Status: Released to Restricted Duty    From:  12/28/2020  Through: 1/4/2021 Restrictions are: Temporary   Physical Restrictions   Sitting:  < or = to 6 hrs/day Standing:    Stooping:  < or = to 6 hrs/day Bending:  < or = to 6 hrs/day   Squatting:    Walking:    Climbing:    Pushing:      Pulling:    Other:    Reaching Above Shoulder (L):   Reaching Above Shoulder (R):       Reaching Below Shoulder (L):    Reaching Below Shoulder (R):      Not to exceed Weight Limits   Carrying(hrs):   Weight Limit(lb):   Lifting(hrs):   Weight  Limit(lb): < or = to 25 pounds   Comments: Feels about 75% better and continues to improve. She did not fill the Rxs for prednisone and flexeril; at this point those medications would likely provide limited benefit.   -provided updated work restrictions   -continue ibuprofen as needed  -activity as tolerated  -f/u in one week      Repetitive Actions   Hands: i.e. Fine Manipulations from Grasping:     Feet: i.e. Operating Foot Controls:     Driving / Operate Machinery:     Provider Name:   Timi Smith D.O. Physician Signature:  Physician Name:     Clinic Name / Location: [address] [address] Clinic Phone Number: Dept: [phone]   Appointment Time: 9:00 Am Visit Start Time: 9:13 AM   Check-In Time:  8:37 Am Visit Discharge Time:  9:40AM   Original-Treating Physician or Chiropractor    Page 2-Insurer/TPA    Page 3-Employer    Page 4-Employee

## 2021-01-04 ENCOUNTER — OCCUPATIONAL MEDICINE (OUTPATIENT)
Dept: URGENT CARE | Facility: PHYSICIAN GROUP | Age: 51
End: 2021-01-04
Payer: COMMERCIAL

## 2021-01-04 VITALS
OXYGEN SATURATION: 96 % | HEART RATE: 70 BPM | HEIGHT: 70 IN | WEIGHT: 143 LBS | RESPIRATION RATE: 12 BRPM | BODY MASS INDEX: 20.47 KG/M2 | SYSTOLIC BLOOD PRESSURE: 92 MMHG | TEMPERATURE: 98.2 F | DIASTOLIC BLOOD PRESSURE: 58 MMHG

## 2021-01-04 DIAGNOSIS — S20.221D BACK CONTUSION, RIGHT, SUBSEQUENT ENCOUNTER: ICD-10-CM

## 2021-01-04 PROCEDURE — 99214 OFFICE O/P EST MOD 30 MIN: CPT | Performed by: NURSE PRACTITIONER

## 2021-01-04 ASSESSMENT — ENCOUNTER SYMPTOMS
FALLS: 0
BACK PAIN: 1
VOMITING: 0
SENSORY CHANGE: 0
NECK PAIN: 0
TINGLING: 0
NAUSEA: 0
CHILLS: 0
FEVER: 0
TREMORS: 0
MYALGIAS: 1
FOCAL WEAKNESS: 0

## 2021-01-04 NOTE — LETTER
[address] [place] [address]  Phone:  [phone] - Fax:  [phone]   [facility] Progress Report and Disability Certification  Date of Service: 1/4/2021   No Show:  No  Date / Time of Next Visit:     Claim Information   Patient Name: Giuliana Hale  Claim Number:     Employer: [employer]  Date of Injury: 12/6/2020     Insurer / TPA: [employer]  ID / SSN:     Occupation: Dispenser OGP  Diagnosis: The encounter diagnosis was Back contusion, right, subsequent encounter.    Medical Information   Related to Industrial Injury?      Subjective Complaints:  DOI: 12/6/20.   Today is patient's 5th visit for contusion to the lower back area. She states her pain is minimal and intermittent at this time and is ready to go back to work full max duty. She has been using Ibuprofen for her pain relief.    She continues to have discomfort with extended sitting. She wakes in the morning without any pain. No radiculopathy. Her lifting restrictions were increased to 25 lbs at her last follow up visit 12/28 and this has been no issue for her. Pain in clinic today is 0/10.      Objective Findings: umbar back: She exhibits normal range of motion, no tenderness, no bony tenderness, no swelling, no edema, no deformity, no laceration, no pain, no spasm and normal pulse.      Pre-Existing Condition(s):     Assessment:   Condition Improved    Status: Discharged /  MMI  Permanent Disability:     Plan:      Diagnostics:      Comments:       Disability Information   Status: Released to Full Duty    From:     Through:   Restrictions are:     Physical Restrictions   Sitting:    Standing:    Stooping:    Bending:      Squatting:    Walking:    Climbing:    Pushing:      Pulling:    Other:    Reaching Above Shoulder (L):   Reaching Above Shoulder (R):       Reaching Below Shoulder (L):    Reaching Below Shoulder (R):      Not to exceed Weight  Limits   Carrying(hrs):   Weight Limit(lb):   Lifting(hrs):   Weight  Limit(lb):     Comments: 1. Back contusion, right, subsequent encounter      She will be discharged from work comp and allowed to return to work per request full max duty. Continue use of NSAIDS PRN pain, ice, heat and stretching. Follow up with worsening symptoms.   AVS handout given and reviewed with patient. Pt educated on red flags and when to seek treatment back in ER or UC.     Repetitive Actions   Hands: i.e. Fine Manipulations from Grasping:     Feet: i.e. Operating Foot Controls:     Driving / Operate Machinery:     Provider Name:   WOJCIECH Beyer Physician Signature:  Physician Name:     Clinic Name / Location: [address] [address] Clinic Phone Number: Dept: [phone]   Appointment Time: 9:00 Am Visit Start Time: 9:14 AM   Check-In Time:  8:52 Am Visit Discharge Time:  10:02AM   Original-Treating Physician or Chiropractor    Page 2-Insurer/TPA    Page 3-Employer    Page 4-Employee

## 2021-01-04 NOTE — PROGRESS NOTES
Subjective:     Giuliana Hale is a 50 y.o. female who presents for Work-Related Injury (Follow-Up, Back px)      HPI  DOI: 12/6/20.   Today is patient's 5th visit for contusion to the lower back area. She states her pain is minimal and intermittent at this time and is ready to go back to work full max duty. She has been using Ibuprofen for her pain relief.    She continues to have discomfort with extended sitting. She wakes in the morning without any pain. No radiculopathy. Her lifting restrictions were increased to 25 lbs at her last follow up visit 12/28 and this has been no issue for her. Pain in clinic today is 0/10.     Review of Systems   Constitutional: Negative for chills and fever.   Gastrointestinal: Negative for nausea and vomiting.   Musculoskeletal: Positive for back pain and myalgias. Negative for falls, joint pain and neck pain.   Skin: Negative for rash.   Neurological: Negative for tingling, tremors, sensory change and focal weakness.       PMH: History reviewed. No pertinent past medical history.  ALLERGIES: No Known Allergies  SURGHX: History reviewed. No pertinent surgical history.  SOCHX:   Social History     Socioeconomic History   • Marital status:      Spouse name: Not on file   • Number of children: Not on file   • Years of education: Not on file   • Highest education level: Not on file   Occupational History   • Not on file   Social Needs   • Financial resource strain: Not on file   • Food insecurity     Worry: Not on file     Inability: Not on file   • Transportation needs     Medical: Not on file     Non-medical: Not on file   Tobacco Use   • Smoking status: Never Smoker   • Smokeless tobacco: Never Used   Substance and Sexual Activity   • Alcohol use: Not Currently   • Drug use: Yes     Types: Marijuana     Comment: CBD gummies for sleeping    • Sexual activity: Not on file   Lifestyle   • Physical activity     Days per week: Not on file     Minutes per session: Not on  "file   • Stress: Not on file   Relationships   • Social connections     Talks on phone: Not on file     Gets together: Not on file     Attends Druze service: Not on file     Active member of club or organization: Not on file     Attends meetings of clubs or organizations: Not on file     Relationship status: Not on file   • Intimate partner violence     Fear of current or ex partner: Not on file     Emotionally abused: Not on file     Physically abused: Not on file     Forced sexual activity: Not on file   Other Topics Concern   • Not on file   Social History Narrative   • Not on file     FH: History reviewed. No pertinent family history.      Objective:   BP (!) 92/58   Pulse 70   Temp 36.8 °C (98.2 °F) (Temporal)   Resp 12   Ht 1.778 m (5' 10\")   Wt 64.9 kg (143 lb)   SpO2 96%   BMI 20.52 kg/m²     Physical Exam  Vitals signs and nursing note reviewed.   Constitutional:       General: She is not in acute distress.     Appearance: Normal appearance. She is not ill-appearing.   HENT:      Head: Normocephalic and atraumatic.      Right Ear: External ear normal.      Left Ear: External ear normal.      Nose: No congestion or rhinorrhea.      Mouth/Throat:      Mouth: Mucous membranes are moist.   Eyes:      Extraocular Movements: Extraocular movements intact.      Pupils: Pupils are equal, round, and reactive to light.   Neck:      Musculoskeletal: Normal range of motion and neck supple.   Cardiovascular:      Rate and Rhythm: Normal rate and regular rhythm.      Pulses: Normal pulses.      Heart sounds: Normal heart sounds.   Pulmonary:      Effort: Pulmonary effort is normal.      Breath sounds: Normal breath sounds.   Abdominal:      General: Abdomen is flat. Bowel sounds are normal.      Palpations: Abdomen is soft.      Tenderness: There is no abdominal tenderness. There is no right CVA tenderness or left CVA tenderness.   Musculoskeletal: Normal range of motion.      Lumbar back: She exhibits normal " range of motion, no tenderness, no bony tenderness, no swelling, no edema, no deformity, no laceration, no pain, no spasm and normal pulse.        Back:    Skin:     General: Skin is warm and dry.      Capillary Refill: Capillary refill takes less than 2 seconds.   Neurological:      General: No focal deficit present.      Mental Status: She is alert and oriented to person, place, and time. Mental status is at baseline.   Psychiatric:         Mood and Affect: Mood normal.         Behavior: Behavior normal.         Thought Content: Thought content normal.         Judgment: Judgment normal.         Assessment/Plan:   Assessment    1. Back contusion, right, subsequent encounter       She will be discharged from work comp and allowed to return to work per request full max duty. Continue use of NSAIDS PRN pain, ice, heat and stretching. Follow up with worsening symptoms.   AVS handout given and reviewed with patient. Pt educated on red flags and when to seek treatment back in ER or UC.

## 2021-09-21 ENCOUNTER — NON-PROVIDER VISIT (OUTPATIENT)
Dept: URGENT CARE | Facility: PHYSICIAN GROUP | Age: 51
End: 2021-09-21

## 2021-09-21 DIAGNOSIS — Z02.1 PRE-EMPLOYMENT DRUG SCREENING: ICD-10-CM

## 2021-09-21 LAB
AMP AMPHETAMINE: NORMAL
COC COCAINE: NORMAL
INT CON NEG: NORMAL
INT CON POS: NORMAL
MET METHAMPHETAMINES: NORMAL
OPI OPIATES: NORMAL
PCP PHENCYCLIDINE: NORMAL
POC DRUG COMMENT 753798-OCCUPATIONAL HEALTH: NEGATIVE
THC: NORMAL

## 2021-09-21 PROCEDURE — 80305 DRUG TEST PRSMV DIR OPT OBS: CPT | Performed by: NURSE PRACTITIONER

## 2022-11-10 ENCOUNTER — OFFICE VISIT (OUTPATIENT)
Dept: URGENT CARE | Facility: PHYSICIAN GROUP | Age: 52
End: 2022-11-10
Payer: COMMERCIAL

## 2022-11-10 VITALS
HEIGHT: 70 IN | OXYGEN SATURATION: 100 % | WEIGHT: 141.4 LBS | RESPIRATION RATE: 16 BRPM | HEART RATE: 81 BPM | SYSTOLIC BLOOD PRESSURE: 120 MMHG | BODY MASS INDEX: 20.24 KG/M2 | DIASTOLIC BLOOD PRESSURE: 80 MMHG | TEMPERATURE: 97.8 F

## 2022-11-10 DIAGNOSIS — S61.451A DOG BITE OF RIGHT HAND, INITIAL ENCOUNTER: ICD-10-CM

## 2022-11-10 DIAGNOSIS — S61.216A LACERATION OF RIGHT LITTLE FINGER WITHOUT FOREIGN BODY WITHOUT DAMAGE TO NAIL, INITIAL ENCOUNTER: ICD-10-CM
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
## 2022-11-10 PROCEDURE — 99213 OFFICE O/P EST LOW 20 MIN: CPT | Mod: 25 | Performed by: NURSE PRACTITIONER

## 2022-11-10 PROCEDURE — 90715 TDAP VACCINE 7 YRS/> IM: CPT | Performed by: NURSE PRACTITIONER

## 2022-11-10 PROCEDURE — 90471 IMMUNIZATION ADMIN: CPT | Performed by: NURSE PRACTITIONER

## 2022-11-10 RX ORDER — IBUPROFEN 200 MG
600 TABLET ORAL ONCE
Status: COMPLETED | OUTPATIENT
Start: 2022-11-10 | End: 2022-11-10

## 2022-11-10 RX ORDER — AMOXICILLIN AND CLAVULANATE POTASSIUM 875; 125 MG/1; MG/1
1 TABLET, FILM COATED ORAL 2 TIMES DAILY
Qty: 14 TABLET | Refills: 0 | Status: SHIPPED | OUTPATIENT
Start: 2022-11-10 | End: 2022-11-17

## 2022-11-10 RX ORDER — IBUPROFEN 600 MG/1
600 TABLET ORAL EVERY 6 HOURS PRN
Qty: 30 TABLET | Refills: 0 | Status: SHIPPED | OUTPATIENT
Start: 2022-11-10

## 2022-11-10 RX ADMIN — Medication 600 MG: at 13:56

## 2022-11-10 NOTE — PATIENT INSTRUCTIONS
-NSAID's (ibuprofen) and tylenol as directed for pain and inflammation.   -RICE Therapy: Rest, Ice, Compression, Elevation  -Keep initial dressing in place for 24 hours. Keep loosely covered while draining.    -Gently clean area with mild soap and water. Do not soak wound in contaminated water (dishwater, swimming pool, lake or other bodies of water).  -Follow up in 2-3 days.    Follow up sooner for signs of infection (redness, red streaking, pus, foul odor, severe pain, increased swelling or fever) or any other concerns. Follow up emergently for severe uncontrolled pain, neurovascular compromise (decreased sensation, motion, or circulation).

## 2022-11-10 NOTE — PROGRESS NOTES
Subjective:     Giuliana Hale is a 51 y.o. female who presents for Laceration ((R) hand pinky, bit by her dog, refuses stiches ) and Letter for School/Work (/Would like to be excused till wed.)       Dog bit her right hand, pinky, last night at 10:30. States the dog's tooth caught and tore finger. Denies numbness. Has full ROM. States she took some European Cipro she had on hand.           Laceration   The laceration is located on the Right hand. The pain is moderate.   Animal Bite  This is a new problem. The current episode started yesterday. Pertinent negatives include no fever. Exacerbated by: Touch.     History reviewed. No pertinent past medical history.    History reviewed. No pertinent surgical history.    Social History     Socioeconomic History    Marital status:      Spouse name: Not on file    Number of children: Not on file    Years of education: Not on file    Highest education level: Not on file   Occupational History    Not on file   Tobacco Use    Smoking status: Never    Smokeless tobacco: Never   Vaping Use    Vaping Use: Never used   Substance and Sexual Activity    Alcohol use: Not Currently    Drug use: Yes     Types: Marijuana     Comment: CBD gummies for sleeping     Sexual activity: Not on file   Other Topics Concern    Not on file   Social History Narrative    Not on file     Social Determinants of Health     Financial Resource Strain: Not on file   Food Insecurity: Not on file   Transportation Needs: Not on file   Physical Activity: Not on file   Stress: Not on file   Social Connections: Not on file   Intimate Partner Violence: Not on file   Housing Stability: Not on file        History reviewed. No pertinent family history.     No Known Allergies    Review of Systems   Constitutional:  Negative for fever.   Musculoskeletal:  Negative for joint pain.   Neurological:  Negative for sensory change.   All other systems reviewed and are negative.     Objective:   /80    "Pulse 81   Temp 36.6 °C (97.8 °F) (Temporal)   Resp 16   Ht 1.778 m (5' 10\")   Wt 64.1 kg (141 lb 6.4 oz)   SpO2 100%   BMI 20.29 kg/m²     Physical Exam  Vitals reviewed.   Pulmonary:      Effort: Pulmonary effort is normal. No respiratory distress.   Musculoskeletal:         General: Swelling, tenderness and signs of injury present.      Right hand: Swelling, laceration and tenderness present. No bony tenderness. Normal range of motion. Normal capillary refill.      Comments: 3.5 cm gaping laceration to distal 5th digit. Distal neurovascular intact.   Skin:     General: Skin is warm and dry.      Capillary Refill: Capillary refill takes less than 2 seconds.      Findings: Laceration present.   Neurological:      Mental Status: She is alert and oriented to person, place, and time.   Psychiatric:         Mood and Affect: Mood is anxious.         Speech: Speech normal.         Behavior: Behavior is cooperative.      Comments: Patient indicated she does not like coming to medical facilities.        Assessment/Plan:   1. Dog bite of right hand, initial encounter  - Tdap =>6yo IM  - amoxicillin-clavulanate (AUGMENTIN) 875-125 MG Tab; Take 1 Tablet by mouth 2 times a day for 7 days.  Dispense: 14 Tablet; Refill: 0  - ibuprofen (MOTRIN) tablet 600 mg  - ibuprofen (MOTRIN) 600 MG Tab; Take 1 Tablet by mouth every 6 hours as needed for Inflammation or Moderate Pain.  Dispense: 30 Tablet; Refill: 0    2. Laceration of right little finger without foreign body without damage to nail, initial encounter  - Tdap =>6yo IM  - amoxicillin-clavulanate (AUGMENTIN) 875-125 MG Tab; Take 1 Tablet by mouth 2 times a day for 7 days.  Dispense: 14 Tablet; Refill: 0  - ibuprofen (MOTRIN) tablet 600 mg  - ibuprofen (MOTRIN) 600 MG Tab; Take 1 Tablet by mouth every 6 hours as needed for Inflammation or Moderate Pain.  Dispense: 30 Tablet; Refill: 0  -NSAID's (ibuprofen) and tylenol as directed for pain and inflammation.   -RICE Therapy: " Rest, Ice, Compression, Elevation  -Keep initial dressing in place for 24 hours. Keep loosely covered while draining.    -Gently clean area with mild soap and water. Do not soak wound in contaminated water (dishwater, swimming pool, lake or other bodies of water).  -Follow up in 2-3 days.    Follow up sooner for signs of infection (redness, red streaking, pus, foul odor, severe pain, increased swelling or fever) or any other concerns. Follow up emergently for severe uncontrolled pain, neurovascular compromise (decreased sensation, motion, or circulation).    Procedure: Laceration Repair  -Risks including bleeding, nerve damage, infection, and poor cosmetic outcome discussed. Benefits and alternatives discussed.   -Irrigated with NS  -Closed with 3 steri strips to closely approximate wound.  -Dressing placed with finger splint to protect tip.   -Patient tolerated well    Tetanus vaccination status reviewed: Td vaccination indicated and given today. Guarded to assessment and cleaning of digit. Discussed benefits in closely approximating edges of wound since it was on the hand. Patient expressed not wanting sutures. Steri strip used to approximate wound. Discussed risk for infection and delayed wound healing. Advised close monitoring of wound with f/u in 2-3 days.    Differential diagnosis, natural history, supportive care, and indications for immediate follow-up discussed.

## 2022-11-10 NOTE — LETTER
November 10, 2022         Patient: Giuliana Hale   YOB: 1970   Date of Visit: 11/10/2022           To Whom it May Concern:    Giuliana Hale was seen in my clinic on 11/10/2022. She may return to work on 11/14/2022.    If you have any questions or concerns, please don't hesitate to call.        Sincerely,           YOGESH Medrano.  Electronically Signed

## 2022-11-13 ENCOUNTER — OFFICE VISIT (OUTPATIENT)
Dept: URGENT CARE | Facility: PHYSICIAN GROUP | Age: 52
End: 2022-11-13
Payer: COMMERCIAL

## 2022-11-13 VITALS
HEIGHT: 70 IN | WEIGHT: 140 LBS | SYSTOLIC BLOOD PRESSURE: 124 MMHG | TEMPERATURE: 98.2 F | DIASTOLIC BLOOD PRESSURE: 64 MMHG | OXYGEN SATURATION: 100 % | HEART RATE: 68 BPM | BODY MASS INDEX: 20.04 KG/M2 | RESPIRATION RATE: 16 BRPM
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
## 2022-11-13 DIAGNOSIS — S61.451A DOG BITE, HAND, RIGHT, INITIAL ENCOUNTER: ICD-10-CM

## 2022-11-13 PROCEDURE — 99213 OFFICE O/P EST LOW 20 MIN: CPT | Performed by: PHYSICIAN ASSISTANT

## 2022-11-13 ASSESSMENT — ENCOUNTER SYMPTOMS
NAUSEA: 0
SENSORY CHANGE: 0
FEVER: 0
TINGLING: 0
FOCAL WEAKNESS: 0
VOMITING: 0
CHILLS: 0

## 2022-11-13 NOTE — PROGRESS NOTES
"Subjective     Giuliana Hale is a 51 y.o. female who presents with Follow-Up (Finger lac fv /Has 1 more sick day off, would like a letter to go to light duty until finger is fully healed. )            The patient is here for follow-up on her right finger dog bite. She currently has steri-strips  in place. She reports improved pain and movement. She has been taking Augmentin and has been doing dressing changes daily. She is no longer taking Ibuprofen. She denies fever chills, nausea, or vomiting.     No past medical history on file.      No past surgical history on file.        No family history on file.      Patient has no known allergies.      Medications, Allergies, and current problem list reviewed today in Epic      Review of Systems   Constitutional:  Negative for chills, fever and malaise/fatigue.   Gastrointestinal:  Negative for nausea and vomiting.   Musculoskeletal:  Positive for joint pain (right pinky).   Skin:         Laceration right 5th finger.   Neurological:  Negative for tingling, sensory change and focal weakness.            Objective     /64   Pulse 68   Temp 36.8 °C (98.2 °F) (Temporal)   Resp 16   Ht 1.778 m (5' 10\")   Wt 63.5 kg (140 lb)   SpO2 100%   BMI 20.09 kg/m²      Physical Exam  Constitutional:       General: She is not in acute distress.     Appearance: She is not ill-appearing.   HENT:      Head: Normocephalic and atraumatic.   Eyes:      Conjunctiva/sclera: Conjunctivae normal.   Cardiovascular:      Rate and Rhythm: Normal rate and regular rhythm.   Pulmonary:      Effort: Pulmonary effort is normal. No respiratory distress.      Breath sounds: No stridor. No wheezing.   Musculoskeletal:        Hands:    Neurological:      General: No focal deficit present.      Mental Status: She is alert and oriented to person, place, and time.   Psychiatric:         Mood and Affect: Mood normal.         Behavior: Behavior normal.         Thought Content: Thought content " normal.         Judgment: Judgment normal.                           Assessment & Plan        1. Dog bite, hand, right, initial encounter            Patient's wound improving.  Continue current treatment/wound care  Continue Antibiotics  RTC 3 days for wound check or sooner prn.  Work note provided.    Differential diagnoses, Supportive care, and indications for immediate follow-up discussed with patient.   Pathogenesis of diagnosis discussed including typical length and natural progression.   Instructed to return to clinic or nearest emergency department for any change in condition, further concerns, or worsening of symptoms.      The patient demonstrated a good understanding and agreed with the treatment plan.    Dorcas Tsai P.A.-C.

## 2022-11-13 NOTE — LETTER
November 13, 2022         Patient: Giuliana Hale   YOB: 1970   Date of Visit: 11/13/2022           To Whom it May Concern:    Giuliana Hale was seen in my clinic on 11/13/2022. She should be excused from work from 11/14/2022- 11/16/2022 due to medical reasons.    If you have any questions or concerns, please don't hesitate to call.        Sincerely,           Dorcas Tsai P.A.-C.  Electronically Signed

## 2022-11-16 ENCOUNTER — OFFICE VISIT (OUTPATIENT)
Dept: URGENT CARE | Facility: PHYSICIAN GROUP | Age: 52
End: 2022-11-16
Payer: COMMERCIAL

## 2022-11-16 VITALS
OXYGEN SATURATION: 99 % | HEART RATE: 88 BPM | SYSTOLIC BLOOD PRESSURE: 110 MMHG | BODY MASS INDEX: 20.04 KG/M2 | WEIGHT: 140 LBS | HEIGHT: 70 IN | DIASTOLIC BLOOD PRESSURE: 80 MMHG | TEMPERATURE: 97.7 F | RESPIRATION RATE: 12 BRPM

## 2022-11-16 DIAGNOSIS — S61.259D: ICD-10-CM

## 2022-11-16 DIAGNOSIS — L08.9: ICD-10-CM

## 2022-11-16 DIAGNOSIS — S61.451D: ICD-10-CM
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
## 2022-11-16 DIAGNOSIS — S61.209D FINGER WOUND, SIMPLE, OPEN, SUBSEQUENT ENCOUNTER: ICD-10-CM

## 2022-11-16 PROCEDURE — 99212 OFFICE O/P EST SF 10 MIN: CPT | Performed by: FAMILY MEDICINE

## 2022-11-16 ASSESSMENT — ENCOUNTER SYMPTOMS
FEVER: 0
SENSORY CHANGE: 0

## 2022-11-16 NOTE — PROGRESS NOTES
"Subjective     Giuliana Hale is a 51 y.o. female who presents with Follow-Up (Dog bite, wants work restriction )            Follow-up visit left distal finger laceration/dog bite.  Wound was initially loosely approximated on 11/10/2022 with Steri-Strips.  She has had persistent pain.  Steri-Strips seem to be falling off.  No expanding redness.  No drainage.  She has tolerated prophylactic Augmentin dose.  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    Wound: Majority of Steri-Strips removed.  Wound irrigated.  Granulation tissue present.  No evidence of secondary bacterial infection.    A/P  Left fifth finger dog bite/laceration.  Healing by secondary intention progressing with granulation tissue present.  No evidence of infection.  I would like her to keep it clean and covered.  Will write light duty restrictions for 1 week.  Follow-up as needed.      ROS           Objective     /80   Pulse 88   Temp 36.5 °C (97.7 °F) (Temporal)   Resp 12   Ht 1.778 m (5' 10\")   Wt 63.5 kg (140 lb)   SpO2 99%   BMI 20.09 kg/m²      Physical Exam                        Assessment & Plan        1. Finger wound, simple, open, subsequent encounter  ***    2. Infected dog bite of hand including fingers, right, subsequent encounter  ***                  "

## 2022-11-16 NOTE — LETTER
November 16, 2022         Patient: Giuliana Hale   YOB: 1970   Date of Visit: 11/16/2022           To Whom it May Concern:    Giuliana Hale was seen in my clinic on 11/16/2022. Please accommodate light duty for 1 week. No lifting or carrying with right hand. No other restriction. May return to full duty on 11/24/2022.     Sincerely,           Gonzales Roque M.D.  Electronically Signed

## 2022-11-16 NOTE — PROGRESS NOTES
Follow-up visit left distal finger laceration/dog bite.  Wound was initially loosely approximated on 11/10/2022 with Steri-Strips.  She has had persistent pain.  Steri-Strips seem to be falling off.  No expanding redness.  No drainage.  She has tolerated prophylactic Augmentin dose.  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     Wound: Majority of Steri-Strips removed.  Wound irrigated.  Granulation tissue present.  No evidence of secondary bacterial infection.     A/P  Left fifth finger dog bite/laceration.  Healing by secondary intention progressing with granulation tissue present.  No evidence of infection.  I would like her to keep it clean and covered.  Will write light duty restrictions for 1 week.  Follow-up as needed.

## 2023-09-13 ENCOUNTER — OFFICE VISIT (OUTPATIENT)
Dept: MEDICAL GROUP | Facility: PHYSICIAN GROUP | Age: 53
End: 2023-09-13
Payer: COMMERCIAL

## 2023-09-13 VITALS
HEART RATE: 73 BPM | SYSTOLIC BLOOD PRESSURE: 110 MMHG | RESPIRATION RATE: 16 BRPM | BODY MASS INDEX: 19.23 KG/M2 | DIASTOLIC BLOOD PRESSURE: 72 MMHG | TEMPERATURE: 98.5 F | OXYGEN SATURATION: 99 % | HEIGHT: 71 IN | WEIGHT: 137.4 LBS

## 2023-09-13 DIAGNOSIS — Z76.89 ESTABLISHING CARE WITH NEW DOCTOR, ENCOUNTER FOR: ICD-10-CM

## 2023-09-13 DIAGNOSIS — Z80.0 FAMILY HISTORY OF COLON CANCER IN FATHER: ICD-10-CM

## 2023-09-13 DIAGNOSIS — R53.83 OTHER FATIGUE: ICD-10-CM

## 2023-09-13 DIAGNOSIS — Z12.4 SCREENING FOR CERVICAL CANCER: ICD-10-CM

## 2023-09-13 DIAGNOSIS — Z11.59 NEED FOR HEPATITIS C SCREENING TEST: ICD-10-CM

## 2023-09-13 DIAGNOSIS — Z00.00 HEALTHCARE MAINTENANCE: ICD-10-CM

## 2023-09-13 DIAGNOSIS — Z13.220 SCREENING FOR LIPID DISORDERS: ICD-10-CM

## 2023-09-13 DIAGNOSIS — R68.82 LOW LIBIDO: ICD-10-CM

## 2023-09-13 DIAGNOSIS — E55.9 VITAMIN D DEFICIENCY: ICD-10-CM

## 2023-09-13 DIAGNOSIS — Z13.1 SCREENING FOR DIABETES MELLITUS: ICD-10-CM

## 2023-09-13 PROCEDURE — 99214 OFFICE O/P EST MOD 30 MIN: CPT | Performed by: NURSE PRACTITIONER

## 2023-09-13 PROCEDURE — 3074F SYST BP LT 130 MM HG: CPT | Performed by: NURSE PRACTITIONER

## 2023-09-13 PROCEDURE — 3078F DIAST BP <80 MM HG: CPT | Performed by: NURSE PRACTITIONER

## 2023-09-13 ASSESSMENT — PATIENT HEALTH QUESTIONNAIRE - PHQ9: CLINICAL INTERPRETATION OF PHQ2 SCORE: 0

## 2023-09-13 NOTE — ASSESSMENT & PLAN NOTE
Declines colonoscopy (never wants to do it-no cologuard even)  Will think about mammogram  Hates needles but will get the labs done in the near future   Will get the flu shot and covid booster

## 2023-09-14 NOTE — PROGRESS NOTES
"Subjective:     CC:   Chief Complaint   Patient presents with    [facility]       HPI:   Giuliana presents today with    Healthcare maintenance  Declines colonoscopy (never wants to do it-no cologuard even)  Will think about mammogram  Hates needles but will get the labs done in the near future   Will get the flu shot and covid booster    Low libido  has already been through menopause   reports she had a healthy sex drive previously and has none now     she is open to discussing this with women's health            ROS per HPI    Objective:     Exam:  /72 (BP Location: Right arm, Patient Position: Sitting, BP Cuff Size: Small adult)   Pulse 73   Temp 36.9 °C (98.5 °F) (Temporal)   Resp 16   Ht 1.803 m (5' 11\")   Wt 62.3 kg (137 lb 6.4 oz)   SpO2 99%   BMI 19.16 kg/m²  Body mass index is 19.16 kg/m².    Physical Exam:  Constitutional: Well-developed and well-nourished female Not diaphoretic. No distress.   Skin: warm, dry, intact, no evidence of rash or concerning lesions  Head: Atraumatic without lesions.  Eyes: Conjunctivae are normal. Pupils are equal, round. No scleral icterus.   Ears:  External ears unremarkable.   Neck: Supple, trachea midline. No thyromegaly present. No cervical or supraclavicular lymphadenopathy.  Cardiovascular: Regular rate and rhythm without murmur.   Pulmonary: Clear to ausculation. Normal effort. No rales, ronchi, or wheezing.  Extremities: No cyanosis, clubbing, erythema, nor edema.   Neurological: Alert and oriented x 3.   Psychiatric:  Behavior, mood, and affect are appropriate.        Assessment & Plan:     52 y.o. female with the following -     1. Screening for cervical cancer  - Referral to Gynecology    2. Screening for lipid disorders  - Lipid Profile; Future    3. Other fatigue  - CBC WITH DIFFERENTIAL; Future  - Comp Metabolic Panel; Future  - TSH WITH REFLEX TO FT4; Future    4. Vitamin D deficiency  - VITAMIN D,25 HYDROXY (DEFICIENCY); Future    5. Screening " for diabetes mellitus  - HEMOGLOBIN A1C; Future    6. Need for hepatitis C screening test  - HEP C VIRUS ANTIBODY; Future    7. Healthcare maintenance    8. Establishing care with new doctor, encounter for    9. Family history of colon cancer in father    10. Low libido     Advised patient that gynecology may wish to do hormone labs since she hates needles she can wait to get my labs done until after she meets with gynecology for her Pap smear     Return in about 18 weeks (around 1/17/2024) for gen check in.    Please note that this dictation was created using voice recognition software. I have made every reasonable attempt to correct obvious errors, but I expect that there are errors of grammar and possibly content that I did not discover before finalizing the note.

## 2023-09-14 NOTE — ASSESSMENT & PLAN NOTE
has already been through menopause   reports she had a healthy sex drive previously and has none now     she is open to discussing this with women's health

## 2024-01-31 ENCOUNTER — OFFICE VISIT (OUTPATIENT)
Dept: MEDICAL GROUP | Facility: PHYSICIAN GROUP | Age: 54
End: 2024-01-31
Payer: COMMERCIAL

## 2024-01-31 VITALS
OXYGEN SATURATION: 95 % | BODY MASS INDEX: 19.88 KG/M2 | DIASTOLIC BLOOD PRESSURE: 74 MMHG | SYSTOLIC BLOOD PRESSURE: 122 MMHG | HEART RATE: 99 BPM | RESPIRATION RATE: 18 BRPM | WEIGHT: 142 LBS | TEMPERATURE: 98.3 F | HEIGHT: 71 IN

## 2024-01-31 DIAGNOSIS — J06.9 UPPER RESPIRATORY TRACT INFECTION, UNSPECIFIED TYPE: ICD-10-CM

## 2024-01-31 LAB
FLUAV RNA SPEC QL NAA+PROBE: NEGATIVE
FLUBV RNA SPEC QL NAA+PROBE: NEGATIVE
RSV RNA SPEC QL NAA+PROBE: NEGATIVE
SARS-COV-2 RNA RESP QL NAA+PROBE: NEGATIVE

## 2024-01-31 PROCEDURE — 99214 OFFICE O/P EST MOD 30 MIN: CPT | Performed by: NURSE PRACTITIONER

## 2024-01-31 PROCEDURE — 3074F SYST BP LT 130 MM HG: CPT | Performed by: NURSE PRACTITIONER

## 2024-01-31 PROCEDURE — 0241U POCT CEPHEID COV-2, FLU A/B, RSV - PCR: CPT | Performed by: NURSE PRACTITIONER

## 2024-01-31 PROCEDURE — 3078F DIAST BP <80 MM HG: CPT | Performed by: NURSE PRACTITIONER

## 2024-01-31 RX ORDER — AZITHROMYCIN 250 MG/1
TABLET, FILM COATED ORAL
Qty: 6 TABLET | Refills: 0 | Status: SHIPPED | OUTPATIENT
Start: 2024-01-31

## 2024-01-31 ASSESSMENT — PATIENT HEALTH QUESTIONNAIRE - PHQ9: CLINICAL INTERPRETATION OF PHQ2 SCORE: 0

## 2024-02-01 NOTE — PROGRESS NOTES
"Subjective:     CC:   Chief Complaint   Patient presents with    Cold Symptoms     Scratchy throat, sneezing, slight cough- 3 days        HPI:   Giuliana presents today with    URI (upper respiratory infection)  3 days ago a coworker coughed on her  She is now feeling a scratchy throat and runny nose  Nonproductive cough  Not sure if she's had chills because she's in a drafty place at work               ROS per HPI    Objective:     Exam:  /74 (BP Location: Right arm, Patient Position: Sitting, BP Cuff Size: Small adult)   Pulse 99   Temp 36.8 °C (98.3 °F) (Temporal)   Resp 18   Ht 1.803 m (5' 11\")   Wt 64.4 kg (142 lb)   SpO2 95%   BMI 19.80 kg/m²  Body mass index is 19.8 kg/m².    Physical Exam:  Constitutional: Well-developed and well-nourished female Not diaphoretic. No distress.   Skin: warm, dry, intact, no evidence of rash or concerning lesions  Head: Atraumatic without lesions.  Eyes: Conjunctivae are normal. Pupils are equal, round. No scleral icterus.   Ears:  External ears unremarkable. Tms wnl.   Neck: Supple, trachea midline. No thyromegaly present. Mild right sided upper cervical lymphadenopathy.  Throat: mild redness in oropharynx, no exudates  Cardiovascular: Regular rate and rhythm without murmur.   Pulmonary: Clear to ausculation. Normal effort. No rales, ronchi, or wheezing.  Extremities: No cyanosis, clubbing, erythema, nor edema.   Neurological: Alert and oriented x 3.   Psychiatric:  Behavior, mood, and affect are appropriate.        Assessment & Plan:     53 y.o. female with the following -     1. Upper respiratory tract infection, unspecified type  - POCT CoV-2, Flu A/B, RSV by PCR  - azithromycin (ZITHROMAX) 250 MG Tab; 2 tabs PO day 1, 1 tab PO days 2-5  Dispense: 6 Tablet; Refill: 0   Neg covid, flu and rsv  Supportive care  Watchful waiting-sent zpak just in case       Return in about 3 months (around 4/30/2024) for pap, labs.    Please note that this dictation was created " using voice recognition software. I have made every reasonable attempt to correct obvious errors, but I expect that there are errors of grammar and possibly content that I did not discover before finalizing the note.

## 2024-02-01 NOTE — ASSESSMENT & PLAN NOTE
3 days ago a coworker coughed on her  She is now feeling a scratchy throat and runny nose  Nonproductive cough  Not sure if she's had chills because she's in a drafty place at work

## 2024-03-20 ENCOUNTER — HOSPITAL ENCOUNTER (OUTPATIENT)
Dept: LAB | Facility: MEDICAL CENTER | Age: 54
End: 2024-03-20
Attending: NURSE PRACTITIONER
Payer: COMMERCIAL

## 2024-03-20 DIAGNOSIS — Z13.220 SCREENING FOR LIPID DISORDERS: ICD-10-CM

## 2024-03-20 DIAGNOSIS — R53.83 OTHER FATIGUE: ICD-10-CM

## 2024-03-20 DIAGNOSIS — E55.9 VITAMIN D DEFICIENCY: ICD-10-CM

## 2024-03-20 DIAGNOSIS — Z11.59 NEED FOR HEPATITIS C SCREENING TEST: ICD-10-CM

## 2024-03-20 DIAGNOSIS — Z13.1 SCREENING FOR DIABETES MELLITUS: ICD-10-CM

## 2024-03-20 LAB
25(OH)D3 SERPL-MCNC: 74 NG/ML (ref 30–100)
ALBUMIN SERPL BCP-MCNC: 4.4 G/DL (ref 3.2–4.9)
ALBUMIN/GLOB SERPL: 1.5 G/DL
ALP SERPL-CCNC: 58 U/L (ref 30–99)
ALT SERPL-CCNC: 15 U/L (ref 2–50)
ANION GAP SERPL CALC-SCNC: 11 MMOL/L (ref 7–16)
AST SERPL-CCNC: 20 U/L (ref 12–45)
BASOPHILS # BLD AUTO: 0.8 % (ref 0–1.8)
BASOPHILS # BLD: 0.07 K/UL (ref 0–0.12)
BILIRUB SERPL-MCNC: 0.4 MG/DL (ref 0.1–1.5)
BUN SERPL-MCNC: 17 MG/DL (ref 8–22)
CALCIUM ALBUM COR SERPL-MCNC: 9 MG/DL (ref 8.5–10.5)
CALCIUM SERPL-MCNC: 9.3 MG/DL (ref 8.5–10.5)
CHLORIDE SERPL-SCNC: 102 MMOL/L (ref 96–112)
CHOLEST SERPL-MCNC: 174 MG/DL (ref 100–199)
CO2 SERPL-SCNC: 26 MMOL/L (ref 20–33)
CREAT SERPL-MCNC: 0.78 MG/DL (ref 0.5–1.4)
EOSINOPHIL # BLD AUTO: 0.34 K/UL (ref 0–0.51)
EOSINOPHIL NFR BLD: 4.1 % (ref 0–6.9)
ERYTHROCYTE [DISTWIDTH] IN BLOOD BY AUTOMATED COUNT: 38.8 FL (ref 35.9–50)
EST. AVERAGE GLUCOSE BLD GHB EST-MCNC: 131 MG/DL
FASTING STATUS PATIENT QL REPORTED: NORMAL
FASTING STATUS PATIENT QL REPORTED: NORMAL
GFR SERPLBLD CREATININE-BSD FMLA CKD-EPI: 92 ML/MIN/1.73 M 2
GLOBULIN SER CALC-MCNC: 2.9 G/DL (ref 1.9–3.5)
GLUCOSE SERPL-MCNC: 97 MG/DL (ref 65–99)
HBA1C MFR BLD: 6.2 % (ref 4–5.6)
HCT VFR BLD AUTO: 41.9 % (ref 37–47)
HCV AB SER QL: NORMAL
HDLC SERPL-MCNC: 67 MG/DL
HGB BLD-MCNC: 13.5 G/DL (ref 12–16)
IMM GRANULOCYTES # BLD AUTO: 0.03 K/UL (ref 0–0.11)
IMM GRANULOCYTES NFR BLD AUTO: 0.4 % (ref 0–0.9)
LDLC SERPL CALC-MCNC: 97 MG/DL
LYMPHOCYTES # BLD AUTO: 2.22 K/UL (ref 1–4.8)
LYMPHOCYTES NFR BLD: 26.7 % (ref 22–41)
MCH RBC QN AUTO: 28.6 PG (ref 27–33)
MCHC RBC AUTO-ENTMCNC: 32.2 G/DL (ref 32.2–35.5)
MCV RBC AUTO: 88.8 FL (ref 81.4–97.8)
MONOCYTES # BLD AUTO: 0.53 K/UL (ref 0–0.85)
MONOCYTES NFR BLD AUTO: 6.4 % (ref 0–13.4)
NEUTROPHILS # BLD AUTO: 5.14 K/UL (ref 1.82–7.42)
NEUTROPHILS NFR BLD: 61.6 % (ref 44–72)
NRBC # BLD AUTO: 0 K/UL
NRBC BLD-RTO: 0 /100 WBC (ref 0–0.2)
PLATELET # BLD AUTO: 329 K/UL (ref 164–446)
PMV BLD AUTO: 10.6 FL (ref 9–12.9)
POTASSIUM SERPL-SCNC: 3.9 MMOL/L (ref 3.6–5.5)
PROT SERPL-MCNC: 7.3 G/DL (ref 6–8.2)
RBC # BLD AUTO: 4.72 M/UL (ref 4.2–5.4)
SODIUM SERPL-SCNC: 139 MMOL/L (ref 135–145)
TRIGL SERPL-MCNC: 50 MG/DL (ref 0–149)
TSH SERPL DL<=0.005 MIU/L-ACNC: 1.22 UIU/ML (ref 0.38–5.33)
WBC # BLD AUTO: 8.3 K/UL (ref 4.8–10.8)

## 2024-03-20 PROCEDURE — 80053 COMPREHEN METABOLIC PANEL: CPT

## 2024-03-20 PROCEDURE — 36415 COLL VENOUS BLD VENIPUNCTURE: CPT

## 2024-03-20 PROCEDURE — 82306 VITAMIN D 25 HYDROXY: CPT

## 2024-03-20 PROCEDURE — 84443 ASSAY THYROID STIM HORMONE: CPT

## 2024-03-20 PROCEDURE — 85025 COMPLETE CBC W/AUTO DIFF WBC: CPT

## 2024-03-20 PROCEDURE — 80061 LIPID PANEL: CPT

## 2024-03-20 PROCEDURE — 83036 HEMOGLOBIN GLYCOSYLATED A1C: CPT

## 2024-03-20 PROCEDURE — 86803 HEPATITIS C AB TEST: CPT

## 2024-03-21 ENCOUNTER — OFFICE VISIT (OUTPATIENT)
Dept: MEDICAL GROUP | Facility: PHYSICIAN GROUP | Age: 54
End: 2024-03-21
Payer: COMMERCIAL

## 2024-03-21 VITALS
SYSTOLIC BLOOD PRESSURE: 118 MMHG | HEIGHT: 71 IN | BODY MASS INDEX: 19.52 KG/M2 | RESPIRATION RATE: 18 BRPM | OXYGEN SATURATION: 98 % | DIASTOLIC BLOOD PRESSURE: 70 MMHG | WEIGHT: 139.4 LBS | HEART RATE: 66 BPM | TEMPERATURE: 98.2 F

## 2024-03-21 DIAGNOSIS — R68.82 LOW LIBIDO: ICD-10-CM

## 2024-03-21 DIAGNOSIS — Z00.00 HEALTHCARE MAINTENANCE: ICD-10-CM

## 2024-03-21 DIAGNOSIS — N89.8 VAGINAL DRYNESS: ICD-10-CM

## 2024-03-21 DIAGNOSIS — R73.03 PREDIABETES: ICD-10-CM

## 2024-03-21 PROBLEM — J06.9 URI (UPPER RESPIRATORY INFECTION): Status: RESOLVED | Noted: 2024-01-31 | Resolved: 2024-03-21

## 2024-03-21 PROCEDURE — 3074F SYST BP LT 130 MM HG: CPT | Performed by: NURSE PRACTITIONER

## 2024-03-21 PROCEDURE — 3078F DIAST BP <80 MM HG: CPT | Performed by: NURSE PRACTITIONER

## 2024-03-21 PROCEDURE — 99214 OFFICE O/P EST MOD 30 MIN: CPT | Performed by: NURSE PRACTITIONER

## 2024-03-21 RX ORDER — CONJUGATED ESTROGENS 0.62 MG/G
CREAM VAGINAL
Qty: 30 G | Refills: 3 | Status: SHIPPED | OUTPATIENT
Start: 2024-03-21

## 2024-03-21 RX ORDER — SILDENAFIL 25 MG/1
TABLET, FILM COATED ORAL
Qty: 15 TABLET | Refills: 3 | Status: SHIPPED | OUTPATIENT
Start: 2024-03-21

## 2024-03-21 ASSESSMENT — FIBROSIS 4 INDEX: FIB4 SCORE: 0.8

## 2024-03-22 NOTE — PROGRESS NOTES
"Subjective:     CC:   Chief Complaint   Patient presents with    Gynecologic Exam    Results     Labs        HPI:   Giuliana presents today with    Prediabetes  Most recent A1c was 6.2; pt has cut out the juices she has been drinking daily after she saw the results  Would like to try Cinsulin and dietary changes first before metformin   Wants to redo in 3 months (I will find out from lab how much it is vs in office)     Low libido  Pt has been w/her  for years; had a sex drive before menopause, she does have any sex drive now; it's also painful for her, so this contributes; she's open to trying something like viagra and estrogen vaginal cream     Healthcare maintenance  Pt is here for a pap today; it's been years since she's had one and she's nervous; reports pain w/sexual intercourse, so she's not sexually active w/her                 ROS per HPI    Objective:     Exam:  /70 (BP Location: Left arm, Patient Position: Sitting, BP Cuff Size: Small adult)   Pulse 66   Temp 36.8 °C (98.2 °F) (Temporal)   Resp 18   Ht 1.803 m (5' 11\")   Wt 63.2 kg (139 lb 6.4 oz)   LMP  (LMP Unknown)   SpO2 98%   BMI 19.44 kg/m²  Body mass index is 19.44 kg/m².    Physical Exam:  Constitutional: Well-developed and well-nourished female . Not diaphoretic. No distress.   Skin: warm, dry, intact, no evidence of rash or concerning lesions  Head: Atraumatic without lesions.  Eyes: Conjunctivae are normal. Pupils are equal, round. No scleral icterus.   Ears:  External ears unremarkable.   Neck: Supple, trachea midline. No thyromegaly present. No cervical or supraclavicular lymphadenopathy.  Cardiovascular: Regular rate and rhythm without murmur.   Pulmonary: Clear to ausculation. Normal effort. No rales, ronchi, or wheezing.  Extremities: No cyanosis, clubbing, erythema, nor edema.   Neurological: Alert and oriented x 3.   Psychiatric:  Behavior, mood, and affect are appropriate.    Attempted to perform a pap; pt " was tense and uncomfortable but cooperative  I was unable to locate the cervix, so I will have pt come back w/Dr Domingo  Started her on estrogen cream and will do a trial of viagra as some research shows it can be helpful, even for women     Assessment & Plan:     53 y.o. female with the following -     1. Prediabetes  Pt wants to try dietary changes and Cinsulin before metformin; will recheck A1c in 6 mos    2. Vaginal dryness  - estrogens, conjugated (PREMARIN) 0.625 MG/GM Cream; Insert applicatorful into vagina 3x weekly  Dispense: 30 g; Refill: 3    3. Low libido  - sildenafil citrate (VIAGRA) 25 MG Tab; 1 tablet 30-60 min before sexual intercourse; may increase to 2 tabs if needed  Dispense: 15 Tablet; Refill: 3    4. Healthcare maintenance         Return in about 3 months (around 6/21/2024) for DM-A1c; pap .    Please note that this dictation was created using voice recognition software. I have made every reasonable attempt to correct obvious errors, but I expect that there are errors of grammar and possibly content that I did not discover before finalizing the note.

## 2024-03-22 NOTE — ASSESSMENT & PLAN NOTE
Pt has been w/her  for years; had a sex drive before menopause, she does have any sex drive now; it's also painful for her, so this contributes; she's open to trying something like viagra and estrogen vaginal cream

## 2024-03-22 NOTE — ASSESSMENT & PLAN NOTE
Pt is here for a pap today; it's been years since she's had one and she's nervous; reports pain w/sexual intercourse, so she's not sexually active w/her

## 2024-03-22 NOTE — ASSESSMENT & PLAN NOTE
Most recent A1c was 6.2; pt has cut out the juices she has been drinking daily after she saw the results  Would like to try Cinsulin and dietary changes first before metformin   Wants to redo in 3 months (I will find out from lab how much it is vs in office)

## 2024-03-22 NOTE — PATIENT INSTRUCTIONS
Research the Glycemic Index and stick to the ones lower in value   Try Cinsulin supplement on [employer] or [employer]

## 2024-06-06 ENCOUNTER — OFFICE VISIT (OUTPATIENT)
Dept: MEDICAL GROUP | Facility: PHYSICIAN GROUP | Age: 54
End: 2024-06-06
Payer: COMMERCIAL

## 2024-06-06 ENCOUNTER — HOSPITAL ENCOUNTER (OUTPATIENT)
Facility: MEDICAL CENTER | Age: 54
End: 2024-06-06
Attending: FAMILY MEDICINE
Payer: COMMERCIAL

## 2024-06-06 VITALS
WEIGHT: 136 LBS | HEIGHT: 71 IN | HEART RATE: 80 BPM | BODY MASS INDEX: 19.04 KG/M2 | DIASTOLIC BLOOD PRESSURE: 72 MMHG | TEMPERATURE: 98.1 F | RESPIRATION RATE: 16 BRPM | SYSTOLIC BLOOD PRESSURE: 112 MMHG | OXYGEN SATURATION: 98 %

## 2024-06-06 DIAGNOSIS — Z12.4 CERVICAL CANCER SCREENING: ICD-10-CM

## 2024-06-06 DIAGNOSIS — F41.9 ANXIETY: ICD-10-CM

## 2024-06-06 DIAGNOSIS — Z12.31 ENCOUNTER FOR SCREENING MAMMOGRAM FOR MALIGNANT NEOPLASM OF BREAST: ICD-10-CM

## 2024-06-06 DIAGNOSIS — R73.03 PREDIABETES: ICD-10-CM

## 2024-06-06 PROCEDURE — 99214 OFFICE O/P EST MOD 30 MIN: CPT | Mod: 25 | Performed by: FAMILY MEDICINE

## 2024-06-06 PROCEDURE — 87624 HPV HI-RISK TYP POOLED RSLT: CPT

## 2024-06-06 PROCEDURE — 3074F SYST BP LT 130 MM HG: CPT | Performed by: FAMILY MEDICINE

## 2024-06-06 PROCEDURE — 3078F DIAST BP <80 MM HG: CPT | Performed by: FAMILY MEDICINE

## 2024-06-06 PROCEDURE — 88175 CYTOPATH C/V AUTO FLUID REDO: CPT

## 2024-06-06 PROCEDURE — 99396 PREV VISIT EST AGE 40-64: CPT | Performed by: FAMILY MEDICINE

## 2024-06-06 PROCEDURE — 99000 SPECIMEN HANDLING OFFICE-LAB: CPT | Performed by: FAMILY MEDICINE

## 2024-06-06 ASSESSMENT — FIBROSIS 4 INDEX: FIB4 SCORE: 0.8

## 2024-06-06 NOTE — ASSESSMENT & PLAN NOTE
Pt has anxiety in various situations, eg heights, she didn't have when a child, hx of trauma with medical care, her pup also almost  with a vaccination.   Ref to counseling provided.

## 2024-06-07 NOTE — ASSESSMENT & PLAN NOTE
Has anxiety with pap smears   Does want to have pap completed today  Pap completed without difficulty.   Normal pelvic exam.

## 2024-06-07 NOTE — PROGRESS NOTES
"Subjective:   Giuliana Hale is a 53 y.o. female here today for evaluation and management of:     Prediabetes  On Cinsulin, has stopped sodas.   Recheck A1c in 3-6 months.       Anxiety  Pt has anxiety in various situations, eg heights, she didn't have when a child, hx of trauma with medical care, her pup also almost  with a vaccination.   Ref to counseling provided.     Cervical cancer screening  Has anxiety with pap smears   Does want to have pap completed today  Pap completed without difficulty.   Normal pelvic exam.          Current medicines (including changes today)  Current Outpatient Medications   Medication Sig Dispense Refill    estrogens, conjugated (PREMARIN) 0.625 MG/GM Cream Insert applicatorful into vagina 3x weekly 30 g 3    sildenafil citrate (VIAGRA) 25 MG Tab 1 tablet 30-60 min before sexual intercourse; may increase to 2 tabs if needed 15 Tablet 3     No current facility-administered medications for this visit.     She  has no past medical history on file.    ROS  No chest pain, no shortness of breath, no abdominal pain       Objective:     /72 (BP Location: Left arm, Patient Position: Sitting, BP Cuff Size: Small adult)   Pulse 80   Temp 36.7 °C (98.1 °F) (Temporal)   Resp 16   Ht 1.803 m (5' 11\")   Wt 61.7 kg (136 lb)   SpO2 98%  Body mass index is 18.97 kg/m².   Physical Exam:  Constitutional: Alert, no distress.  Skin: Warm, dry, good turgor, no rashes in visible areas.  Eye: Equal, round and reactive, conjunctiva clear, lids normal.  ENMT: Lips without lesions, good dentition, oropharynx clear.  Neck: Trachea midline, no masses, no thyromegaly. No cervical or supraclavicular lymphadenopathy  Respiratory: Unlabored respiratory effort, lungs clear to auscultation, no wheezes, no ronchi.  Cardiovascular: Normal S1, S2, no murmur, no edema.  Abdomen: Soft, non-tender, no masses, no hepatosplenomegaly.  Psych: Alert and oriented x3, normal affect and mood.  Pelvic exam: " normal external, no lesions, no vaginal lesions, no cervical lesions, no cervical motion tenderness, no adnexal masses, uterus normal size.     Assessment and Plan:   The following treatment plan was discussed    1. Encounter for screening mammogram for malignant neoplasm of breast  - MA-SCREENING MAMMO BILAT W/CAD; Future    2. Prediabetes  - HEMOGLOBIN A1C; Future    3. Cervical cancer screening  - Thinprep Pap with HPV; Future    4. Anxiety  - Referral to Psychology      Followup: Return in about 6 months (around 12/6/2024) for Lab Review.

## 2024-06-10 ENCOUNTER — DOCUMENTATION (OUTPATIENT)
Dept: HEALTH INFORMATION MANAGEMENT | Facility: OTHER | Age: 54
End: 2024-06-10
Payer: COMMERCIAL

## 2024-06-15 LAB
CYTOLOGIST CVX/VAG CYTO: NORMAL
CYTOLOGY CVX/VAG DOC CYTO: NORMAL
CYTOLOGY CVX/VAG DOC THIN PREP: NORMAL
HPV I/H RISK 4 DNA CVX QL PROBE+SIG AMP: NEGATIVE
NOTE NL11727A: NORMAL
OTHER STN SPEC: NORMAL
QC REVIEWED BY NL11722A: NORMAL
STAT OF ADQ CVX/VAG CYTO-IMP: NORMAL

## 2024-08-27 ENCOUNTER — OFFICE VISIT (OUTPATIENT)
Dept: URGENT CARE | Facility: PHYSICIAN GROUP | Age: 54
End: 2024-08-27
Payer: COMMERCIAL

## 2024-08-27 VITALS
SYSTOLIC BLOOD PRESSURE: 118 MMHG | OXYGEN SATURATION: 100 % | TEMPERATURE: 97.6 F | RESPIRATION RATE: 16 BRPM | HEIGHT: 70 IN | BODY MASS INDEX: 20.47 KG/M2 | WEIGHT: 143 LBS | DIASTOLIC BLOOD PRESSURE: 82 MMHG | HEART RATE: 76 BPM

## 2024-08-27 DIAGNOSIS — M54.16 LUMBAR RADICULOPATHY: ICD-10-CM

## 2024-08-27 PROCEDURE — 99214 OFFICE O/P EST MOD 30 MIN: CPT | Performed by: FAMILY MEDICINE

## 2024-08-27 PROCEDURE — 3079F DIAST BP 80-89 MM HG: CPT | Performed by: FAMILY MEDICINE

## 2024-08-27 PROCEDURE — 3074F SYST BP LT 130 MM HG: CPT | Performed by: FAMILY MEDICINE

## 2024-08-27 ASSESSMENT — FIBROSIS 4 INDEX: FIB4 SCORE: 0.8

## 2024-08-27 NOTE — LETTER
"August 27, 2024        Patient: Giuliana Hale   YOB: 1970   Date of Visit: 8/27/2024           To Whom It May Concern:    It is my medical opinion that Giuliana Hale may return to limited participation immediately with the following restrictions:      -She is not allowed to wear the \"printer\" on her body  -she is allow to lift up to 80 lbs.     If you have any questions or concerns, please don't hesitate to call.        Sincerely,          Florentino Love M.D.  Electronically Signed    [address] [address] [phone] (Phone)  [phone] (Fax)    "

## 2024-08-28 NOTE — PROGRESS NOTES
"    rt lumbar radiculopathy    This is an ongoing problem x 1 yr    Worse in past several weeks    She wears an apron at work and it has a heavy mobile printer attached to it.   She feels that this is exacerbating her pre-existing sciatic pain        The pain is present in the lumbar spine. The quality of the pain is described as sharp. The pain does radiate down rt leg. The pain is moderate. The symptoms are aggravated by position. Pertinent negatives include no bladder incontinence, bowel incontinence, dysuria, fever, headaches,  or weakness.  also has some leg tingling/numbness.   Treatments tried: motrin.  The treatment provided no relief.            No past medical history on file.      Social History     Tobacco Use    Smoking status: Never    Smokeless tobacco: Never   Vaping Use    Vaping status: Never Used   Substance Use Topics    Alcohol use: Not Currently    Drug use: Not Currently     Types: Marijuana     Comment: CBD gummies for sleeping              Review of Systems   Constitutional: Negative for fever, chills and malaise/fatigue.   Eyes: Negative for vision changes, d/c.    Respiratory: Negative for cough and sputum production.    Cardiovascular: Negative for chest pain and palpitations.   Gastrointestinal: Negative for nausea, vomiting, abdominal pain, diarrhea and constipation.   Genitourinary: Negative for dysuria, urgency and frequency.   Skin: Negative for rash or  itching.   Neurological: Negative for dizziness and headaches.   Psychiatric/Behavioral: Negative for depression.   Hematologic/lymphatic - denies bruising or excessive bleeding  All other systems reviewed and are negative.         Objective:     /82   Pulse 76   Temp 36.4 °C (97.6 °F) (Temporal)   Resp 16   Ht 1.778 m (5' 10\")   Wt 64.9 kg (143 lb)   SpO2 100%       Physical Exam   Constitutional: pt is oriented to person, place, and time. Pt appears well-developed and well-nourished. No distress.   HENT:   Head: " Normocephalic and atraumatic.   Eyes: EOM are normal. Pupils are equal, round, and reactive to light. No scleral icterus.   Neck: Normal range of motion. Neck supple. No thyromegaly present.   Cardiovascular: Normal rate, regular rhythm and normal heart sounds.    Pulmonary/Chest: Effort normal and breath sounds normal. No respiratory distress.  no wheezes.   no rales.  no tenderness.   Musculoskeletal: pt exhibits no edema.                  Lumbar spine: pt exhibits decreased range of motion, spasm and right sided tenderness . Pt exhibits no bony tenderness, no swelling, no edema, no deformity  .   Neurological: patient is alert and oriented to person, place, and time. Patient has normal reflexes. No cranial nerve deficit. Patient exhibits normal muscle tone.      STRAIGHT LEG RAISE POSITIVE ON RT  Skin: Skin is warm and dry. No rash noted. No erythema.   Psychiatric: patient has a normal mood and affect.  behavior is normal.   Nursing note and vitals reviewed.               Assessment/Plan:       1. Lumbar radiculopathy   Chronic, with acute flare    - diclofenac sodium (VOLTAREN) 1 % Gel; Apply 4 g topically 4 times a day as needed (pain).  Dispense: 50 g; Refill: 0  - MR-LUMBAR SPINE-W/O; Future  - Referral to Physical Therapy         Differential diagnosis, natural history, supportive care, and indications for immediate follow-up discussed. All questions answered. Patient agrees with the plan of care.     Follow-up as needed if symptoms worsen or fail to improve to PCP, Urgent care or Emergency Room.     I have personally reviewed prior external notes and test results pertinent to today's visit.  I have independently reviewed and interpreted all diagnostics ordered during this urgent care acute visit.

## 2024-09-10 ENCOUNTER — OFFICE VISIT (OUTPATIENT)
Dept: MEDICAL GROUP | Facility: PHYSICIAN GROUP | Age: 54
End: 2024-09-10
Payer: COMMERCIAL

## 2024-09-10 VITALS
WEIGHT: 141 LBS | HEIGHT: 70 IN | RESPIRATION RATE: 16 BRPM | HEART RATE: 68 BPM | DIASTOLIC BLOOD PRESSURE: 76 MMHG | TEMPERATURE: 97.5 F | OXYGEN SATURATION: 98 % | SYSTOLIC BLOOD PRESSURE: 118 MMHG | BODY MASS INDEX: 20.19 KG/M2

## 2024-09-10 DIAGNOSIS — M54.31 SCIATICA OF RIGHT SIDE: ICD-10-CM

## 2024-09-10 DIAGNOSIS — R73.03 PREDIABETES: ICD-10-CM

## 2024-09-10 PROCEDURE — 3078F DIAST BP <80 MM HG: CPT | Performed by: FAMILY MEDICINE

## 2024-09-10 PROCEDURE — 99214 OFFICE O/P EST MOD 30 MIN: CPT | Performed by: FAMILY MEDICINE

## 2024-09-10 PROCEDURE — 3074F SYST BP LT 130 MM HG: CPT | Performed by: FAMILY MEDICINE

## 2024-09-10 ASSESSMENT — FIBROSIS 4 INDEX: FIB4 SCORE: 0.8

## 2024-09-10 NOTE — ASSESSMENT & PLAN NOTE
Increased right sciatica with carrying a printer at work.   Ppwk provided to accommodate not carring a printer  Ref to PT provided.   Uses tylenol, cbd oil.

## 2024-09-10 NOTE — PROGRESS NOTES
"Subjective:   Giuliana Hale is a 53 y.o. female here today for evaluation and management of:     Sciatica of right side  Increased right sciatica with carrying a printer at work.   Ppwk provided to accommodate not carring a printer  Ref to PT provided.   Uses tylenol, cbd oil.     Prediabetes  Last A1c 6.2  Pt is lean with BMI 20  Has made diet changes, stopped drinking sodas  Encouraged to get repeat A1c done.          Current medicines (including changes today)  No current outpatient medications on file.     No current facility-administered medications for this visit.     She  has no past medical history on file.    ROS  No chest pain, no shortness of breath, no abdominal pain       Objective:     /76 (BP Location: Left arm, Patient Position: Sitting, BP Cuff Size: Adult)   Pulse 68   Temp 36.4 °C (97.5 °F) (Temporal)   Resp 16   Ht 1.778 m (5' 10\")   Wt 64 kg (141 lb)   SpO2 98%  Body mass index is 20.23 kg/m².   Physical Exam:  Constitutional: Alert, no distress, well-groomed.  Skin: No rashes in visible areas.  Eye: Round. Conjunctiva clear, lids normal. No icterus.   ENMT: Lips pink without lesions, good dentition, moist mucous membranes. Phonation normal.  Neck: No masses, no thyromegaly. Moves freely without pain.  Respiratory: Unlabored respiratory effort, no cough or audible wheeze  Psych: Alert and oriented x3, normal affect and mood.    Negative straight leg raise b/l  Assessment and Plan:   The following treatment plan was discussed    1. Sciatica of right side    2. Prediabetes      Followup: Return for As Scheduled, Lab Review, scan ppwk please.           "

## 2024-09-10 NOTE — ASSESSMENT & PLAN NOTE
Last A1c 6.2  Pt is lean with BMI 20  Has made diet changes, stopped drinking sodas  Encouraged to get repeat A1c done.

## 2025-12-18 ENCOUNTER — APPOINTMENT (OUTPATIENT)
Dept: MEDICAL GROUP | Facility: PHYSICIAN GROUP | Age: 55
End: 2025-12-18
Payer: COMMERCIAL